# Patient Record
Sex: MALE | Race: BLACK OR AFRICAN AMERICAN | NOT HISPANIC OR LATINO | URBAN - METROPOLITAN AREA
[De-identification: names, ages, dates, MRNs, and addresses within clinical notes are randomized per-mention and may not be internally consistent; named-entity substitution may affect disease eponyms.]

---

## 2021-08-31 ENCOUNTER — EMERGENCY (EMERGENCY)
Facility: HOSPITAL | Age: 31
LOS: 1 days | Discharge: HOME | End: 2021-08-31
Attending: EMERGENCY MEDICINE | Admitting: EMERGENCY MEDICINE
Payer: COMMERCIAL

## 2021-08-31 VITALS
DIASTOLIC BLOOD PRESSURE: 81 MMHG | SYSTOLIC BLOOD PRESSURE: 152 MMHG | HEART RATE: 76 BPM | WEIGHT: 250 LBS | RESPIRATION RATE: 18 BRPM | HEIGHT: 73 IN | TEMPERATURE: 98 F | OXYGEN SATURATION: 95 %

## 2021-08-31 VITALS
DIASTOLIC BLOOD PRESSURE: 72 MMHG | HEART RATE: 66 BPM | RESPIRATION RATE: 18 BRPM | TEMPERATURE: 98 F | SYSTOLIC BLOOD PRESSURE: 119 MMHG | OXYGEN SATURATION: 96 %

## 2021-08-31 DIAGNOSIS — Z20.822 CONTACT WITH AND (SUSPECTED) EXPOSURE TO COVID-19: ICD-10-CM

## 2021-08-31 DIAGNOSIS — Z83.2 FAMILY HISTORY OF DISEASES OF THE BLOOD AND BLOOD-FORMING ORGANS AND CERTAIN DISORDERS INVOLVING THE IMMUNE MECHANISM: ICD-10-CM

## 2021-08-31 DIAGNOSIS — D57.1 SICKLE-CELL DISEASE WITHOUT CRISIS: ICD-10-CM

## 2021-08-31 DIAGNOSIS — M25.551 PAIN IN RIGHT HIP: ICD-10-CM

## 2021-08-31 DIAGNOSIS — M25.561 PAIN IN RIGHT KNEE: ICD-10-CM

## 2021-08-31 DIAGNOSIS — D72.829 ELEVATED WHITE BLOOD CELL COUNT, UNSPECIFIED: ICD-10-CM

## 2021-08-31 LAB
ALBUMIN SERPL ELPH-MCNC: 3.9 G/DL — SIGNIFICANT CHANGE UP (ref 3.4–5)
ALP SERPL-CCNC: 78 U/L — SIGNIFICANT CHANGE UP (ref 40–120)
ALT FLD-CCNC: 77 U/L — HIGH (ref 12–42)
ANION GAP SERPL CALC-SCNC: 10 MMOL/L — SIGNIFICANT CHANGE UP (ref 9–16)
APPEARANCE UR: CLEAR — SIGNIFICANT CHANGE UP
AST SERPL-CCNC: 71 U/L — HIGH (ref 15–37)
BASO STIPL BLD QL SMEAR: PRESENT — SIGNIFICANT CHANGE UP
BASOPHILS # BLD AUTO: 0.07 K/UL — SIGNIFICANT CHANGE UP (ref 0–0.2)
BASOPHILS NFR BLD AUTO: 0.4 % — SIGNIFICANT CHANGE UP (ref 0–2)
BILIRUB SERPL-MCNC: 1.9 MG/DL — HIGH (ref 0.2–1.2)
BILIRUB UR-MCNC: NEGATIVE — SIGNIFICANT CHANGE UP
BUN SERPL-MCNC: 11 MG/DL — SIGNIFICANT CHANGE UP (ref 7–23)
CALCIUM SERPL-MCNC: 9.1 MG/DL — SIGNIFICANT CHANGE UP (ref 8.5–10.5)
CHLORIDE SERPL-SCNC: 100 MMOL/L — SIGNIFICANT CHANGE UP (ref 96–108)
CO2 SERPL-SCNC: 27 MMOL/L — SIGNIFICANT CHANGE UP (ref 22–31)
COLOR SPEC: YELLOW — SIGNIFICANT CHANGE UP
CREAT SERPL-MCNC: 1.1 MG/DL — SIGNIFICANT CHANGE UP (ref 0.5–1.3)
DIFF PNL FLD: NEGATIVE — SIGNIFICANT CHANGE UP
EOSINOPHIL # BLD AUTO: 0.34 K/UL — SIGNIFICANT CHANGE UP (ref 0–0.5)
EOSINOPHIL NFR BLD AUTO: 1.9 % — SIGNIFICANT CHANGE UP (ref 0–6)
GLUCOSE SERPL-MCNC: 96 MG/DL — SIGNIFICANT CHANGE UP (ref 70–99)
GLUCOSE UR QL: NEGATIVE — SIGNIFICANT CHANGE UP
HCT VFR BLD CALC: 37.8 % — LOW (ref 39–50)
HGB BLD-MCNC: 14 G/DL — SIGNIFICANT CHANGE UP (ref 13–17)
HOWELL-JOLLY BOD BLD QL SMEAR: PRESENT — SIGNIFICANT CHANGE UP
IMM GRANULOCYTES NFR BLD AUTO: 0.3 % — SIGNIFICANT CHANGE UP (ref 0–1.5)
KETONES UR-MCNC: NEGATIVE — SIGNIFICANT CHANGE UP
LEUKOCYTE ESTERASE UR-ACNC: NEGATIVE — SIGNIFICANT CHANGE UP
LG PLATELETS BLD QL AUTO: SLIGHT — SIGNIFICANT CHANGE UP
LYMPHOCYTES # BLD AUTO: 12.9 % — LOW (ref 13–44)
LYMPHOCYTES # BLD AUTO: 2.31 K/UL — SIGNIFICANT CHANGE UP (ref 1–3.3)
MACROCYTES BLD QL: SIGNIFICANT CHANGE UP
MANUAL SMEAR VERIFICATION: SIGNIFICANT CHANGE UP
MCHC RBC-ENTMCNC: 30.4 PG — SIGNIFICANT CHANGE UP (ref 27–34)
MCHC RBC-ENTMCNC: 37 GM/DL — HIGH (ref 32–36)
MCV RBC AUTO: 82 FL — SIGNIFICANT CHANGE UP (ref 80–100)
MICROCYTES BLD QL: SLIGHT — SIGNIFICANT CHANGE UP
MONOCYTES # BLD AUTO: 2.57 K/UL — HIGH (ref 0–0.9)
MONOCYTES NFR BLD AUTO: 14.3 % — HIGH (ref 2–14)
NEUTROPHILS # BLD AUTO: 12.58 K/UL — HIGH (ref 1.8–7.4)
NEUTROPHILS NFR BLD AUTO: 70.2 % — SIGNIFICANT CHANGE UP (ref 43–77)
NITRITE UR-MCNC: NEGATIVE — SIGNIFICANT CHANGE UP
NRBC # BLD: 1 /100 WBCS — HIGH (ref 0–0)
PAPPENHEIMER BOD BLD QL SMEAR: PRESENT — SIGNIFICANT CHANGE UP
PH UR: 6 — SIGNIFICANT CHANGE UP (ref 5–8)
PLAT MORPH BLD: ABNORMAL
PLATELET # BLD AUTO: 412 K/UL — HIGH (ref 150–400)
PLATELET CLUMP BLD QL SMEAR: ABNORMAL
PLATELET COUNT - ESTIMATE: ABNORMAL
POTASSIUM SERPL-MCNC: 3.4 MMOL/L — LOW (ref 3.5–5.3)
POTASSIUM SERPL-SCNC: 3.4 MMOL/L — LOW (ref 3.5–5.3)
PROT SERPL-MCNC: 8.1 G/DL — SIGNIFICANT CHANGE UP (ref 6.4–8.2)
PROT UR-MCNC: NEGATIVE MG/DL — SIGNIFICANT CHANGE UP
RBC # BLD: 4.61 M/UL — SIGNIFICANT CHANGE UP (ref 4.2–5.8)
RBC # BLD: 4.61 M/UL — SIGNIFICANT CHANGE UP (ref 4.2–5.8)
RBC # FLD: 15.8 % — HIGH (ref 10.3–14.5)
RBC BLD AUTO: ABNORMAL
RETICS #: 287.4 K/UL — HIGH (ref 25–125)
RETICS/RBC NFR: 6.2 % — HIGH (ref 0.5–2.5)
SARS-COV-2 RNA SPEC QL NAA+PROBE: SIGNIFICANT CHANGE UP
SCHISTOCYTES BLD QL AUTO: SIGNIFICANT CHANGE UP
SICKLE CELLS BLD QL SMEAR: SIGNIFICANT CHANGE UP
SODIUM SERPL-SCNC: 137 MMOL/L — SIGNIFICANT CHANGE UP (ref 132–145)
SP GR SPEC: 1.01 — SIGNIFICANT CHANGE UP (ref 1–1.03)
STOMATOCYTES BLD QL SMEAR: SLIGHT — SIGNIFICANT CHANGE UP
TARGETS BLD QL SMEAR: SIGNIFICANT CHANGE UP
UROBILINOGEN FLD QL: 1 E.U./DL — SIGNIFICANT CHANGE UP
WBC # BLD: 17.93 K/UL — HIGH (ref 3.8–10.5)
WBC # FLD AUTO: 17.93 K/UL — HIGH (ref 3.8–10.5)

## 2021-08-31 PROCEDURE — 73501 X-RAY EXAM HIP UNI 1 VIEW: CPT | Mod: 26,RT

## 2021-08-31 PROCEDURE — 99284 EMERGENCY DEPT VISIT MOD MDM: CPT

## 2021-08-31 PROCEDURE — 71045 X-RAY EXAM CHEST 1 VIEW: CPT | Mod: 26

## 2021-08-31 RX ORDER — OXYCODONE AND ACETAMINOPHEN 5; 325 MG/1; MG/1
1 TABLET ORAL
Qty: 8 | Refills: 0
Start: 2021-08-31 | End: 2021-09-01

## 2021-08-31 RX ORDER — HYDROMORPHONE HYDROCHLORIDE 2 MG/ML
1 INJECTION INTRAMUSCULAR; INTRAVENOUS; SUBCUTANEOUS ONCE
Refills: 0 | Status: DISCONTINUED | OUTPATIENT
Start: 2021-08-31 | End: 2021-08-31

## 2021-08-31 RX ORDER — SODIUM CHLORIDE 9 MG/ML
1000 INJECTION INTRAMUSCULAR; INTRAVENOUS; SUBCUTANEOUS ONCE
Refills: 0 | Status: COMPLETED | OUTPATIENT
Start: 2021-08-31 | End: 2021-08-31

## 2021-08-31 RX ORDER — KETOROLAC TROMETHAMINE 30 MG/ML
15 SYRINGE (ML) INJECTION ONCE
Refills: 0 | Status: DISCONTINUED | OUTPATIENT
Start: 2021-08-31 | End: 2021-08-31

## 2021-08-31 RX ORDER — ACETAMINOPHEN 500 MG
650 TABLET ORAL ONCE
Refills: 0 | Status: COMPLETED | OUTPATIENT
Start: 2021-08-31 | End: 2021-08-31

## 2021-08-31 RX ADMIN — HYDROMORPHONE HYDROCHLORIDE 1 MILLIGRAM(S): 2 INJECTION INTRAMUSCULAR; INTRAVENOUS; SUBCUTANEOUS at 18:57

## 2021-08-31 RX ADMIN — Medication 15 MILLIGRAM(S): at 17:22

## 2021-08-31 RX ADMIN — Medication 15 MILLIGRAM(S): at 18:58

## 2021-08-31 RX ADMIN — SODIUM CHLORIDE 1000 MILLILITER(S): 9 INJECTION INTRAMUSCULAR; INTRAVENOUS; SUBCUTANEOUS at 18:45

## 2021-08-31 RX ADMIN — HYDROMORPHONE HYDROCHLORIDE 1 MILLIGRAM(S): 2 INJECTION INTRAMUSCULAR; INTRAVENOUS; SUBCUTANEOUS at 18:58

## 2021-08-31 RX ADMIN — Medication 650 MILLIGRAM(S): at 18:57

## 2021-08-31 RX ADMIN — HYDROMORPHONE HYDROCHLORIDE 1 MILLIGRAM(S): 2 INJECTION INTRAMUSCULAR; INTRAVENOUS; SUBCUTANEOUS at 17:22

## 2021-08-31 RX ADMIN — SODIUM CHLORIDE 1000 MILLILITER(S): 9 INJECTION INTRAMUSCULAR; INTRAVENOUS; SUBCUTANEOUS at 19:35

## 2021-08-31 RX ADMIN — HYDROMORPHONE HYDROCHLORIDE 1 MILLIGRAM(S): 2 INJECTION INTRAMUSCULAR; INTRAVENOUS; SUBCUTANEOUS at 20:48

## 2021-08-31 NOTE — ED PROVIDER NOTE - NSFOLLOWUPINSTRUCTIONS_ED_ALL_ED_FT
You were seen in the ED for a Sickle Cell Pain Crisis.    Sickle cell anemia is a condition in which red blood cells have an abnormal “sickle” shape. Red blood cells carry oxygen through the body. Sickle-shaped red blood cells do not live as long as normal red blood cells. They also clump together and block blood from flowing through the blood vessels. This condition prevents the body from getting enough oxygen. Sickle cell anemia causes organ damage and pain. It also increases the risk of infection.     What are the causes?  This condition is caused by a gene that is passed from parent to child (inherited). Receiving two copies of the gene causes the disease. Receiving one copy causes the "trait," which means that symptoms are milder or not present.    What increases the risk?  This condition is more likely to develop if your ancestors were from [place], the [place], [place], [place], [place], or the [place].    What are the signs or symptoms?  Symptoms of this condition include:    Episodes of pain (crises), especially in the hands and feet, joints, back, chest, or abdomen. The pain can be triggered by:  An illness, especially if there is dehydration.  Doing an activity with great effort (overexertion).  Exposure to extreme temperature changes.  High altitude.  Fatigue.  Shortness of breath or difficulty breathing.  Dizziness.  Pale skin or yellowed skin (jaundice).  Frequent bacterial infections.  Pain and swelling in the hands and feet (hand-food syndrome).  Prolonged, painful erection of the penis (priapism).   Acute chest syndrome. Symptoms of this include:  Chest pain.  Fever.  Cough.  Fast breathing.  Stroke.  Decreased activity.  Loss of appetite.  Change in behavior.  Headaches.  Seizures.  Vision changes.  Skin ulcers.  Heart disease.  High blood pressure.  Gallstones.  Liver and kidney problems.    How is this diagnosed?  This condition is diagnosed with blood tests that check for the gene that causes this condition.    How is this treated?  There is no cure for most cases of this condition. Treatment focuses on managing your symptoms and preventing complications of the disease. Your health care provider will work with you to identify the best treatment options for you based on an assessment of your condition. Treatment may include:    Medicines, including:  Pain medicines.  Antibiotic medicines for infection.  Medicines to increase the production of a protein in red blood cells that helps carry oxygen in the body (hemoglobin).  Fluids to treat pain and swelling.  Oxygen to treat acute chest syndrome.  Blood transfusions to treat symptoms such as fatigue, stroke, and acute chest syndrome.  Massage and physical therapy for pain.  Regular tests to monitor your condition, such as blood tests, X-rays, CT scans, MRI scans, ultrasounds, and lung function tests. These should be done every 3–12 months, depending on your age.  Hematopoietic stem cell transplant. This is a procedure to replace abnormal stem cells with healthy stem cells from a donor's bone marrow. Stem cells are cells that can develop into blood cells, and bone marrow is the spongy tissue inside the bones.    Follow these instructions at home:  Medicines    Take over-the-counter and prescription medicines only as told by your health care provider.  If you were prescribed an antibiotic medicine, take it as told by your health care provider. Do not stop taking the antibiotic even if you start to feel better.  If you develop a fever, do not take medicines to reduce the fever right away. This could cover up another problem. Notify your health care provider.    Managing pain, stiffness, and swelling    Try these methods to help ease your pain:  Using a heating pad.  Taking a warm bath.  Distracting yourself, such as by watching TV.    Eating and drinking    Drink enough fluid to keep your urine clear or pale yellow. Drink more in hot weather and during exercise.  Limit or avoid drinking alcohol.  Eat a balanced and nutritious diet. Eat plenty of fruits, vegetables, whole grains, and lean protein.  Take vitamins and supplements as directed by your health care provider.    Traveling    When traveling, keep these with you:  Your medical information.  The names of your health care providers.  Your medicines.  If you have to travel by air, ask about precautions you should take.    Activity    Get plenty of rest.  Avoid activities that will lower your oxygen levels, such as exercising vigorously.    General instructions    Do not use any products that contain nicotine or tobacco, such as cigarettes and e-cigarettes. They lower blood oxygen levels. If you need help quitting, ask your health care provider.  Consider wearing a medical alert bracelet.  Avoid high altitudes.  Avoid extreme temperatures and extreme temperature changes.  Keep all follow-up visits as told by your health care provider. This is important.    Contact a health care provider if:  You develop joint pain.  Your feet or hands swell or have pain.  You have fatigue.    Get help right away if:  You have symptoms of infection. These include:  Fever.  Chills.  Extreme tiredness.  Irritability.  Poor eating.  Vomiting.  You feel dizzy or faint.  You have new abdominal pain, especially on the left side near the stomach area.  You develop priapism.  You have numbness in your arms or legs or have trouble moving them.  You have trouble talking.  You develop pain that cannot be controlled with medicine.  You become short of breath.  You have rapid breathing.  You have a persistent cough.  You have pain in your chest.  You develop a severe headache or stiff neck.  You feel bloated without eating or after eating a small amount of food.  Your skin is pale.  You suddenly lose vision.    Summary  Sickle cell anemia is a condition in which red blood cells have an abnormal “sickle” shape. This disease can cause organ damage and chronic pain, and it can raise your risk of infection.  Sickle cell anemia is a genetic disorder.  Treatment focuses on managing your symptoms and preventing complications of the disease.  Get medical help right away if you have any signs of infection, such as a fever. You were seen in the ED for a Sickle Cell Pain Crisis.  Your COVID-19 test (PCR) was NEGATIVE.    Sickle cell anemia is a condition in which red blood cells have an abnormal “sickle” shape. Red blood cells carry oxygen through the body. Sickle-shaped red blood cells do not live as long as normal red blood cells. They also clump together and block blood from flowing through the blood vessels. This condition prevents the body from getting enough oxygen. Sickle cell anemia causes organ damage and pain. It also increases the risk of infection.     What are the causes?  This condition is caused by a gene that is passed from parent to child (inherited). Receiving two copies of the gene causes the disease. Receiving one copy causes the "trait," which means that symptoms are milder or not present.    What increases the risk?  This condition is more likely to develop if your ancestors were from [place], [place], [place], [place], [place], or the [place].    What are the signs or symptoms?  Symptoms of this condition include:    Episodes of pain (crises), especially in the hands and feet, joints, back, chest, or abdomen. The pain can be triggered by:  An illness, especially if there is dehydration.  Doing an activity with great effort (overexertion).  Exposure to extreme temperature changes.  High altitude.  Fatigue.  Shortness of breath or difficulty breathing.  Dizziness.  Pale skin or yellowed skin (jaundice).  Frequent bacterial infections.  Pain and swelling in the hands and feet (hand-food syndrome).  Prolonged, painful erection of the penis (priapism).   Acute chest syndrome. Symptoms of this include:  Chest pain.  Fever.  Cough.  Fast breathing.  Stroke.  Decreased activity.  Loss of appetite.  Change in behavior.  Headaches.  Seizures.  Vision changes.  Skin ulcers.  Heart disease.  High blood pressure.  Gallstones.  Liver and kidney problems.    How is this diagnosed?  This condition is diagnosed with blood tests that check for the gene that causes this condition.    How is this treated?  There is no cure for most cases of this condition. Treatment focuses on managing your symptoms and preventing complications of the disease. Your health care provider will work with you to identify the best treatment options for you based on an assessment of your condition. Treatment may include:    Medicines, including:  Pain medicines.  Antibiotic medicines for infection.  Medicines to increase the production of a protein in red blood cells that helps carry oxygen in the body (hemoglobin).  Fluids to treat pain and swelling.  Oxygen to treat acute chest syndrome.  Blood transfusions to treat symptoms such as fatigue, stroke, and acute chest syndrome.  Massage and physical therapy for pain.  Regular tests to monitor your condition, such as blood tests, X-rays, CT scans, MRI scans, ultrasounds, and lung function tests. These should be done every 3–12 months, depending on your age.  Hematopoietic stem cell transplant. This is a procedure to replace abnormal stem cells with healthy stem cells from a donor's bone marrow. Stem cells are cells that can develop into blood cells, and bone marrow is the spongy tissue inside the bones.    Follow these instructions at home:  Medicines    Take over-the-counter and prescription medicines only as told by your health care provider.  If you were prescribed an antibiotic medicine, take it as told by your health care provider. Do not stop taking the antibiotic even if you start to feel better.  If you develop a fever, do not take medicines to reduce the fever right away. This could cover up another problem. Notify your health care provider.    Managing pain, stiffness, and swelling    Try these methods to help ease your pain:  Using a heating pad.  Taking a warm bath.  Distracting yourself, such as by watching TV.    Eating and drinking    Drink enough fluid to keep your urine clear or pale yellow. Drink more in hot weather and during exercise.  Limit or avoid drinking alcohol.  Eat a balanced and nutritious diet. Eat plenty of fruits, vegetables, whole grains, and lean protein.  Take vitamins and supplements as directed by your health care provider.    Traveling    When traveling, keep these with you:  Your medical information.  The names of your health care providers.  Your medicines.  If you have to travel by air, ask about precautions you should take.    Activity    Get plenty of rest.  Avoid activities that will lower your oxygen levels, such as exercising vigorously.    General instructions    Do not use any products that contain nicotine or tobacco, such as cigarettes and e-cigarettes. They lower blood oxygen levels. If you need help quitting, ask your health care provider.  Consider wearing a medical alert bracelet.  Avoid high altitudes.  Avoid extreme temperatures and extreme temperature changes.  Keep all follow-up visits as told by your health care provider. This is important.    Contact a health care provider if:  You develop joint pain.  Your feet or hands swell or have pain.  You have fatigue.    Get help right away if:  You have symptoms of infection. These include:  Fever.  Chills.  Extreme tiredness.  Irritability.  Poor eating.  Vomiting.  You feel dizzy or faint.  You have new abdominal pain, especially on the left side near the stomach area.  You develop priapism.  You have numbness in your arms or legs or have trouble moving them.  You have trouble talking.  You develop pain that cannot be controlled with medicine.  You become short of breath.  You have rapid breathing.  You have a persistent cough.  You have pain in your chest.  You develop a severe headache or stiff neck.  You feel bloated without eating or after eating a small amount of food.  Your skin is pale.  You suddenly lose vision.    Summary  Sickle cell anemia is a condition in which red blood cells have an abnormal “sickle” shape. This disease can cause organ damage and chronic pain, and it can raise your risk of infection.  Sickle cell anemia is a genetic disorder.  Treatment focuses on managing your symptoms and preventing complications of the disease.  Get medical help right away if you have any signs of infection, such as a fever.

## 2021-08-31 NOTE — ED PROVIDER NOTE - PATIENT PORTAL LINK FT
You can access the FollowMyHealth Patient Portal offered by [facility] by registering at the following website: [email or web address]. By joining [employer] [facility]’s FollowMyHealth portal, you will also be able to view your health information using other applications (apps) compatible with our system.

## 2021-08-31 NOTE — ED ADULT NURSE NOTE - OBJECTIVE STATEMENT
Pt w/ PMH of SCA presents c/o 8/10 pain to right hip and right knee since Saturday.  Pt has been attempting to control pain w/ tylenol and percocet, but endorses limited relief.  Pt denies CP, fall, injury, SOB or abdominal pain.  Pt is able to bear weight and ambulate.

## 2021-08-31 NOTE — ED PROVIDER NOTE - NS ED ROS FT
Other than symptoms associated with present events the following is reported:  General:  No fever, no chills, no weight loss.  HEENT:  No sore throat.  Respiratory: No cough, no dyspnea, no wheeze.  Cardiovascular:  No chest pain, no palpitations, no orthopnea.  GI: No abdominal pain, no nausea/vomiting, no diarrhea.  : No dysuria, no frequency, no urgency.  Musculoskeletal:  +Knee pain. +Hip pain.   Endocrine:  No generalized weakness, no polyuria.  Neurological:  No headache, no focal weakness.   Psychiatric: No emotional stress, no depression.  Derm:  No rash.  Heme:  No bruising, no bleeding.

## 2021-08-31 NOTE — ED PROVIDER NOTE - CLINICAL SUMMARY MEDICAL DECISION MAKING FREE TEXT BOX
32 y/o male with history of sickle cell anemia presents with right leg pain typical of sickle cell crisis. No chest pain or SOB to suggest acute chest. On exam, Patient is afebrile, well appearing, and with FROM of right hip. X-ray is negative for fracture or gross abnormality. Labs show leukocytosis, nonspecific. Do not suspect septic arthritis clinically. Will check UA, chest x-ray, and COVID swab to evaluate further. 32 y/o male with history of sickle cell anemia presents with right hip and knee pain typical of sickle cell crisis. Exam unremarkable.  No chest pain or SOB to suggest acute chest. On exam, Patient is afebrile, well appearing, with FROM of right hip.  Suspect sickle cell crisis.    X-ray R hip is negative for fracture or gross abnormality.   Labs show leukocytosis, nonspecific. Pt states he usually has elevated WBC during sickle cell crisis.  Do not suspect septic arthritis clinically.    CXR negative for PNA.      Pt feels better after IV dilaudid, toradol, and hydration.  UA pending to eval for UTI.  COVID swab pending.  If pt feels better after analgesia, OK to dc home with plan for follow up with PMD.

## 2021-08-31 NOTE — ED PROVIDER NOTE - PROGRESS NOTE DETAILS
Patient feeling better, but requesting 3rd (and final) dose of Dilaudid for pain.  He feels like he can go home and will F/U with hematologist re: sickle cell disease.  Instructed to return if symptoms worsen. Care turned over to me @ 1930.  Patient feeling better, but requesting 3rd (and final) dose of Dilaudid for pain.  He feels like he can go home and will F/U with hematologist re: sickle cell disease.  Instructed to return if symptoms worsen.  -- Toribio Robles MD Care turned over to me @ 1930 by Dr. Mindy Matos.  Patient feeling better, but requesting 3rd (and final) dose of Dilaudid for pain.  He feels like he can go home and will F/U with hematologist re: sickle cell disease.  Instructed to return if symptoms worsen.  -- Toribio Robles MD

## 2021-08-31 NOTE — ED ADULT NURSE REASSESSMENT NOTE - NS ED NURSE REASSESS COMMENT FT1
received Pt from previous RN sitting up on stretcher awake and alert, breathing with ease on RA and NAD. IV site is patent. IVF completed, DC same. Urine sample collected and sent. Awaiting dispo.

## 2021-08-31 NOTE — ED PROVIDER NOTE - OBJECTIVE STATEMENT
30 y/o male with PMHx of sickle cell anemia presents to the ED with complaints of aching right hip and knee pain x 3 days, typical of his usual symptoms with sickle cell crisis. No associated chest pain or SOB. Patient states he has not needed to go to the ED or be hospitalized for the past 4 years. Taking Percocet at home w/o relief. No history avascular necrosis. 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 No associated chest pain or SOB. Patient states he has not needed to go to the ED or be hospitalized for the past 4 years. Taking Percocet at home w/o relief. No history avascular necrosis.

## 2021-08-31 NOTE — ED PROVIDER NOTE - PHYSICAL EXAMINATION
VITAL SIGNS: I have reviewed nursing notes and confirm.  CONSTITUTIONAL: Well-developed; well-nourished; in no acute distress.  SKIN: Skin is warm and dry, no acute rash.  HEAD: Normocephalic; atraumatic.  EYES: PERRL, EOM intact; conjunctiva and sclera clear.  ENT: No nasal discharge; airway clear.  NECK: Supple; non tender.  CARD: S1, S2 normal; no murmurs, gallops, or rubs. Regular rate and rhythm.  RESP: No wheezes, rales or rhonchi.  ABD: Normal bowel sounds; soft; non-distended; non-tender; no hepatosplenomegaly.  EXT: No tenderness to right hip or knee. Full active and passive ROM of right hip and knee.   NEURO: Alert, oriented. Grossly unremarkable.  PSYCH: Cooperative, appropriate.

## 2021-08-31 NOTE — ED ADULT TRIAGE NOTE - CHIEF COMPLAINT QUOTE
here for sickle cell crisis - reports pain from right knee to hip- took 2 tabs of 10/325 percocet at 6:30am today with no relief

## 2021-09-08 PROBLEM — Z00.00 ENCOUNTER FOR PREVENTIVE HEALTH EXAMINATION: Status: ACTIVE | Noted: 2021-09-08

## 2023-02-20 ENCOUNTER — NON-APPOINTMENT (OUTPATIENT)
Age: 33
End: 2023-02-20

## 2023-02-22 PROBLEM — D57.1 SICKLE-CELL DISEASE WITHOUT CRISIS: Chronic | Status: ACTIVE | Noted: 2021-09-20

## 2023-02-24 ENCOUNTER — LABORATORY RESULT (OUTPATIENT)
Age: 33
End: 2023-02-24

## 2023-02-24 ENCOUNTER — APPOINTMENT (OUTPATIENT)
Dept: ORTHOPEDIC SURGERY | Facility: CLINIC | Age: 33
End: 2023-02-24
Payer: COMMERCIAL

## 2023-02-24 ENCOUNTER — NON-APPOINTMENT (OUTPATIENT)
Age: 33
End: 2023-02-24

## 2023-02-24 ENCOUNTER — OUTPATIENT (OUTPATIENT)
Dept: OUTPATIENT SERVICES | Facility: HOSPITAL | Age: 33
LOS: 1 days | End: 2023-02-24
Payer: COMMERCIAL

## 2023-02-24 VITALS
OXYGEN SATURATION: 93 % | HEART RATE: 73 BPM | DIASTOLIC BLOOD PRESSURE: 79 MMHG | SYSTOLIC BLOOD PRESSURE: 129 MMHG | HEIGHT: 73 IN | WEIGHT: 248 LBS | BODY MASS INDEX: 32.87 KG/M2

## 2023-02-24 DIAGNOSIS — D57.1 SICKLE-CELL DISEASE W/OUT CRISIS: ICD-10-CM

## 2023-02-24 DIAGNOSIS — M22.2X2 PATELLOFEMORAL DISORDERS, RIGHT KNEE: ICD-10-CM

## 2023-02-24 DIAGNOSIS — S62.009A UNSPECIFIED FRACTURE OF NAVICULAR [SCAPHOID] BONE OF UNSPECIFIED WRIST, INITIAL ENCOUNTER FOR CLOSED FRACTURE: ICD-10-CM

## 2023-02-24 DIAGNOSIS — M22.2X1 PATELLOFEMORAL DISORDERS, RIGHT KNEE: ICD-10-CM

## 2023-02-24 DIAGNOSIS — M25.561 PAIN IN RIGHT KNEE: ICD-10-CM

## 2023-02-24 DIAGNOSIS — M25.562 PAIN IN RIGHT KNEE: ICD-10-CM

## 2023-02-24 DIAGNOSIS — M25.462 EFFUSION, LEFT KNEE: ICD-10-CM

## 2023-02-24 PROCEDURE — 73564 X-RAY EXAM KNEE 4 OR MORE: CPT

## 2023-02-24 PROCEDURE — 99203 OFFICE O/P NEW LOW 30 MIN: CPT | Mod: 25

## 2023-02-24 PROCEDURE — 20611 DRAIN/INJ JOINT/BURSA W/US: CPT | Mod: LT

## 2023-02-24 PROCEDURE — 73564 X-RAY EXAM KNEE 4 OR MORE: CPT | Mod: 26,LT,76

## 2023-02-24 NOTE — PHYSICAL EXAM
[de-identified] : General: Well-nourished, well-developed, alert, and in no acute distress.\par Head: Normocephalic.\par Eyes: Pupils equal, extraocular muscles intact, normal sclera.\par Nose: No nasal discharge.\par Cardiovascular: Extremities are warm and well perfused. Distal pulses are symmetric bilaterally.\par Respiratory: No labored breathing.\par Extremities: Sensation is intact distally bilaterally. Distal pulses are symmetric bilaterally\par Lymphatic: No regional lymphadenopathy, no lymphedema\par Neurologic: No focal deficits\par Skin: Normal skin color, texture, and turgor\par Psychiatric: Normal affect \par \par MSK:\par Examination of left knee:\par \par Gait normal\par Genu slight varus alignment\par Pain with double leg squat\par Valgus moment with single leg squat bilat\par Moderate effusion\par No erythema, hematoma or skin lesion\par Tender to palpation: VMO, quad tendon\par Nontender to palpation: medial joint line, lateral joint line, medial patellar facet, lateral patellar facet, patellar tendon, pes, Gerdy's tubercle, tibial tuberosity, popliteal fossa, hamstrings, ITB \par No warmth\par No Baker's cyst palpable\par ROM: 0-100\par No patellar crepitus\par \par Lachman's negative\par Anterior drawer negative\par Posterior drawer negative\par Varus/valgus stress negative at 0 and 30 deg\par Nikolay  negative\par Patellar grind positive\par Noble's negative\par \par Examination of right knee:\par \par No effusion, erythema, hematoma or skin lesion\par Nontender to palpation: medial joint line, lateral joint line, medial patellar facet, lateral patellar facet, quad tendon, patellar tendon, pes, Gerdy's tubercle, tibial tuberosity, popliteal fossa, hamstrings, ITB \par No warmth\par No Baker's cyst palpable\par ROM: 0-120\par No patellar crepitus\par \par Lachman's negative\par Anterior drawer negative\par Posterior drawer negative\par Varus/valgus stress negative at 0 and 30 deg\par Nikolay  negative\par Patellar grind negative\par Noble's negative\par \par Sensation is intact to light touch over the superficial and deep peroneal nerve distributions and the posterior tibial nerve distribution. Capillary refill is less than two seconds. Posterior tibial and dorsalis pedis pulses 2+ equal bilaterally. No calf swelling or tenderness bilaterally. Strength testing shows 5/5 Quads, 5/5 Hamstrings, 5/5 EHL, 5/5 FHL, 5/5 tibialis anterior, 5/5 gastroc-soleus complex. \par Reflexes: Patellar 2+, Achilles 2+  [de-identified] : XR bilateral knees (2/24/23): No evidence of fracture or dislocation. No joint space narrowing or deformity.

## 2023-02-24 NOTE — PROCEDURE
[de-identified] : Ultrasound guided intra-articular steroid injection of left knee:\par \par Following a discussion of the risks (bleeding, infection) and benefits, verbal consent was obtained. Patient placed in supine position. The suprapatellar recess and surrounding structures (patella, femur, quadriceps tendon, suprapatellar fat pad and prefemoral fat pad) were visualized in SAX and LAX with Sonosite 15 Hz linear transducer. \par \par Superolateral knee was anaesthetised with ethyl chloride spray. Under strict sterile technique the right knee was prepped with chlorhexadine. Using ultrasound guidance (superolateral approach) a 20 G 1.5 inch needle was inserted into the suprapatellar recess and after aspiration of 40 mL of clear, serous fluid, 1mL Triamcinolone, 4mL 0.5% Bupivacaine and 2mL 1% lidocaine was injected intra-articularly.  \par The patient tolerated the procedure well. Post-injection instructions given (no strenuous activity for 48 hours, ice, elevate). Patient verbalized understanding.

## 2023-02-24 NOTE — ASSESSMENT
[FreeTextEntry1] : BLANKA PIÑA is a 32 year old male w/ SCD with left knee pain.\par I discussed with the patient that their symptoms, signs, and imaging are most consistent with patellofemoral syndrome, possible meniscus tear or inflammatory arthropathy. \par We reviewed the natural history of this condition and treatment options.\par  We agreed on the following plan:\par \par \par US guided left knee aspiration and CSI injection performed as detailed above. \par Start Home Exercises for patellar tracking. Demonstration and handout provided. \par Physical therapy. Referral provided.\par Advanced imaging: consider MRI if no improvement in symptoms.\par Follow up in 6-8 weeks.\par

## 2023-02-24 NOTE — HISTORY OF PRESENT ILLNESS
[de-identified] : BLANKA PIÑA is a 32 year old male w/ Sickle Cell HbgS who presents with left knee pain.\par States the onset of pain was 2/16/23\par Initially had right knee pain and swelling 3 weeks ago which resolved with acupuncture\par Played football [employer] had left patellartendinitis which resolved with PT \par Had some quad tendon pain\par Oct 2022 developed right knee swelling when drinking with work colleagues beer, red wine, martinis\par Jan 2023 had bilateral knee pain when squatting under a rope barrier at [facility] then developed right knee swelling\par Told by PT has slight leg length discrepancy (right shorter than left). Wears small heel lift\par Has SCD with multiple hospitalizations for crisis involving back, UE. Never affected knees before.\par No antecedent trauma or injury.\par Was seen in [facility] on 2/21/23 and was given Ketorolac injection which helped somewhat. No XR taken at that time.\par Pain is nonradiating.\par There is associated swelling.\par There is no associated erythema, warmth, stiffness, numbness, paraesthesia or weakness.\par Exacerbating factors are descending stairs, rising from seated position.\par Has been taking ibuprofen with some effect.\par Exercises regularly. 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

## 2023-02-28 ENCOUNTER — TRANSCRIPTION ENCOUNTER (OUTPATIENT)
Age: 33
End: 2023-02-28

## 2023-03-10 ENCOUNTER — OUTPATIENT (OUTPATIENT)
Dept: OUTPATIENT SERVICES | Facility: HOSPITAL | Age: 33
LOS: 1 days | End: 2023-03-10

## 2023-03-10 ENCOUNTER — APPOINTMENT (OUTPATIENT)
Dept: MRI IMAGING | Facility: CLINIC | Age: 33
End: 2023-03-10
Payer: COMMERCIAL

## 2023-03-10 PROCEDURE — 73721 MRI JNT OF LWR EXTRE W/O DYE: CPT | Mod: 26,LT

## 2023-06-14 NOTE — ED ADULT NURSE NOTE - DATE OF LAST VACCINATION
Was The Patient On Physician Recommended Anticoagulation Therapy?: Please Select the Appropriate Response 31-May-2021

## 2024-03-13 ENCOUNTER — EMERGENCY (EMERGENCY)
Facility: HOSPITAL | Age: 34
LOS: 1 days | Discharge: HOME | End: 2024-03-13
Attending: EMERGENCY MEDICINE | Admitting: EMERGENCY MEDICINE
Payer: COMMERCIAL

## 2024-03-13 VITALS
HEART RATE: 104 BPM | TEMPERATURE: 100 F | DIASTOLIC BLOOD PRESSURE: 83 MMHG | OXYGEN SATURATION: 100 % | SYSTOLIC BLOOD PRESSURE: 138 MMHG | RESPIRATION RATE: 22 BRPM

## 2024-03-13 DIAGNOSIS — R50.9 FEVER, UNSPECIFIED: ICD-10-CM

## 2024-03-13 DIAGNOSIS — Z20.822 CONTACT WITH AND (SUSPECTED) EXPOSURE TO COVID-19: ICD-10-CM

## 2024-03-13 DIAGNOSIS — R10.9 UNSPECIFIED ABDOMINAL PAIN: ICD-10-CM

## 2024-03-13 DIAGNOSIS — D57.00 HB-SS DISEASE WITH CRISIS, UNSPECIFIED: ICD-10-CM

## 2024-03-13 LAB
ALBUMIN SERPL ELPH-MCNC: 3.4 G/DL — SIGNIFICANT CHANGE UP (ref 3.4–5)
ALP SERPL-CCNC: 68 U/L — SIGNIFICANT CHANGE UP (ref 40–120)
ALT FLD-CCNC: 60 U/L — HIGH (ref 12–42)
ANION GAP SERPL CALC-SCNC: 8 MMOL/L — LOW (ref 9–16)
APPEARANCE UR: CLEAR — SIGNIFICANT CHANGE UP
APTT BLD: 27.8 SEC — SIGNIFICANT CHANGE UP (ref 24.5–35.6)
AST SERPL-CCNC: 70 U/L — HIGH (ref 15–37)
BASOPHILS # BLD AUTO: 0.06 K/UL — SIGNIFICANT CHANGE UP (ref 0–0.2)
BASOPHILS NFR BLD AUTO: 0.3 % — SIGNIFICANT CHANGE UP (ref 0–2)
BILIRUB SERPL-MCNC: 2.6 MG/DL — HIGH (ref 0.2–1.2)
BILIRUB UR-MCNC: NEGATIVE — SIGNIFICANT CHANGE UP
BUN SERPL-MCNC: 11 MG/DL — SIGNIFICANT CHANGE UP (ref 7–23)
CALCIUM SERPL-MCNC: 8.8 MG/DL — SIGNIFICANT CHANGE UP (ref 8.5–10.5)
CHLORIDE SERPL-SCNC: 103 MMOL/L — SIGNIFICANT CHANGE UP (ref 96–108)
CO2 SERPL-SCNC: 28 MMOL/L — SIGNIFICANT CHANGE UP (ref 22–31)
COLOR SPEC: SIGNIFICANT CHANGE UP
CREAT SERPL-MCNC: 1.13 MG/DL — SIGNIFICANT CHANGE UP (ref 0.5–1.3)
DIFF PNL FLD: NEGATIVE — SIGNIFICANT CHANGE UP
EGFR: 89 ML/MIN/1.73M2 — SIGNIFICANT CHANGE UP
ELLIPTOCYTES BLD QL SMEAR: SLIGHT — SIGNIFICANT CHANGE UP
EOSINOPHIL # BLD AUTO: 0.02 K/UL — SIGNIFICANT CHANGE UP (ref 0–0.5)
EOSINOPHIL NFR BLD AUTO: 0.1 % — SIGNIFICANT CHANGE UP (ref 0–6)
FLUAV AG NPH QL: SIGNIFICANT CHANGE UP
FLUBV AG NPH QL: SIGNIFICANT CHANGE UP
GLUCOSE SERPL-MCNC: 97 MG/DL — SIGNIFICANT CHANGE UP (ref 70–99)
GLUCOSE UR QL: NEGATIVE MG/DL — SIGNIFICANT CHANGE UP
HCT VFR BLD CALC: 35.5 % — LOW (ref 39–50)
HGB BLD-MCNC: 12.3 G/DL — LOW (ref 13–17)
HYPOCHROMIA BLD QL: SIGNIFICANT CHANGE UP
IMM GRANULOCYTES NFR BLD AUTO: 0.7 % — SIGNIFICANT CHANGE UP (ref 0–0.9)
INR BLD: 1.19 — HIGH (ref 0.85–1.18)
KETONES UR-MCNC: NEGATIVE MG/DL — SIGNIFICANT CHANGE UP
LEUKOCYTE ESTERASE UR-ACNC: NEGATIVE — SIGNIFICANT CHANGE UP
LG PLATELETS BLD QL AUTO: SLIGHT — SIGNIFICANT CHANGE UP
LYMPHOCYTES # BLD AUTO: 2.09 K/UL — SIGNIFICANT CHANGE UP (ref 1–3.3)
LYMPHOCYTES # BLD AUTO: 9 % — LOW (ref 13–44)
MANUAL SMEAR VERIFICATION: SIGNIFICANT CHANGE UP
MCHC RBC-ENTMCNC: 28.1 PG — SIGNIFICANT CHANGE UP (ref 27–34)
MCHC RBC-ENTMCNC: 34.6 GM/DL — SIGNIFICANT CHANGE UP (ref 32–36)
MCV RBC AUTO: 81.1 FL — SIGNIFICANT CHANGE UP (ref 80–100)
MICROCYTES BLD QL: SLIGHT — SIGNIFICANT CHANGE UP
MONOCYTES # BLD AUTO: 3.84 K/UL — HIGH (ref 0–0.9)
MONOCYTES NFR BLD AUTO: 16.4 % — HIGH (ref 2–14)
NEUTROPHILS # BLD AUTO: 17.18 K/UL — HIGH (ref 1.8–7.4)
NEUTROPHILS NFR BLD AUTO: 73.5 % — SIGNIFICANT CHANGE UP (ref 43–77)
NITRITE UR-MCNC: NEGATIVE — SIGNIFICANT CHANGE UP
NRBC # BLD: 0 /100 WBCS — SIGNIFICANT CHANGE UP (ref 0–0)
PH UR: 5.5 — SIGNIFICANT CHANGE UP (ref 5–8)
PLAT MORPH BLD: NORMAL — SIGNIFICANT CHANGE UP
PLATELET # BLD AUTO: 516 K/UL — HIGH (ref 150–400)
PLATELET COUNT - ESTIMATE: NORMAL — SIGNIFICANT CHANGE UP
POLYCHROMASIA BLD QL SMEAR: SLIGHT — SIGNIFICANT CHANGE UP
POTASSIUM SERPL-MCNC: 4.2 MMOL/L — SIGNIFICANT CHANGE UP (ref 3.5–5.3)
POTASSIUM SERPL-SCNC: 4.2 MMOL/L — SIGNIFICANT CHANGE UP (ref 3.5–5.3)
PROT SERPL-MCNC: 8.2 G/DL — SIGNIFICANT CHANGE UP (ref 6.4–8.2)
PROT UR-MCNC: NEGATIVE MG/DL — SIGNIFICANT CHANGE UP
PROTHROM AB SERPL-ACNC: 13.4 SEC — HIGH (ref 9.5–13)
RBC # BLD: 4.38 M/UL — SIGNIFICANT CHANGE UP (ref 4.2–5.8)
RBC # BLD: 4.38 M/UL — SIGNIFICANT CHANGE UP (ref 4.2–5.8)
RBC # FLD: 16.1 % — HIGH (ref 10.3–14.5)
RBC BLD AUTO: ABNORMAL
RETICS #: 241.8 K/UL — HIGH (ref 25–125)
RETICS/RBC NFR: 5.5 % — HIGH (ref 0.5–2.5)
RSV RNA NPH QL NAA+NON-PROBE: SIGNIFICANT CHANGE UP
SARS-COV-2 RNA SPEC QL NAA+PROBE: SIGNIFICANT CHANGE UP
SCHISTOCYTES BLD QL AUTO: SLIGHT — SIGNIFICANT CHANGE UP
SICKLE CELLS BLD QL SMEAR: SIGNIFICANT CHANGE UP
SODIUM SERPL-SCNC: 139 MMOL/L — SIGNIFICANT CHANGE UP (ref 132–145)
SP GR SPEC: 1.01 — SIGNIFICANT CHANGE UP (ref 1–1.03)
STOMATOCYTES BLD QL SMEAR: SLIGHT — SIGNIFICANT CHANGE UP
TARGETS BLD QL SMEAR: SLIGHT — SIGNIFICANT CHANGE UP
TROPONIN I, HIGH SENSITIVITY RESULT: <4 NG/L — SIGNIFICANT CHANGE UP
UROBILINOGEN FLD QL: 1 MG/DL — SIGNIFICANT CHANGE UP (ref 0.2–1)
WBC # BLD: 23.35 K/UL — HIGH (ref 3.8–10.5)
WBC # FLD AUTO: 23.35 K/UL — HIGH (ref 3.8–10.5)

## 2024-03-13 PROCEDURE — 99223 1ST HOSP IP/OBS HIGH 75: CPT

## 2024-03-13 PROCEDURE — 76705 ECHO EXAM OF ABDOMEN: CPT | Mod: 26

## 2024-03-13 PROCEDURE — 71045 X-RAY EXAM CHEST 1 VIEW: CPT | Mod: 26

## 2024-03-13 RX ORDER — HYDROMORPHONE HYDROCHLORIDE 2 MG/ML
1 INJECTION INTRAMUSCULAR; INTRAVENOUS; SUBCUTANEOUS ONCE
Refills: 0 | Status: DISCONTINUED | OUTPATIENT
Start: 2024-03-13 | End: 2024-03-13

## 2024-03-13 RX ORDER — HYDROMORPHONE HYDROCHLORIDE 2 MG/ML
2 INJECTION INTRAMUSCULAR; INTRAVENOUS; SUBCUTANEOUS ONCE
Refills: 0 | Status: DISCONTINUED | OUTPATIENT
Start: 2024-03-13 | End: 2024-03-13

## 2024-03-13 RX ORDER — ACETAMINOPHEN 500 MG
650 TABLET ORAL ONCE
Refills: 0 | Status: COMPLETED | OUTPATIENT
Start: 2024-03-13 | End: 2024-03-13

## 2024-03-13 RX ORDER — KETOROLAC TROMETHAMINE 30 MG/ML
15 SYRINGE (ML) INJECTION ONCE
Refills: 0 | Status: DISCONTINUED | OUTPATIENT
Start: 2024-03-13 | End: 2024-03-13

## 2024-03-13 RX ORDER — SODIUM CHLORIDE 9 MG/ML
1000 INJECTION INTRAMUSCULAR; INTRAVENOUS; SUBCUTANEOUS ONCE
Refills: 0 | Status: COMPLETED | OUTPATIENT
Start: 2024-03-13 | End: 2024-03-13

## 2024-03-13 RX ORDER — ACETAMINOPHEN 500 MG
325 TABLET ORAL ONCE
Refills: 0 | Status: COMPLETED | OUTPATIENT
Start: 2024-03-13 | End: 2024-03-13

## 2024-03-13 RX ADMIN — SODIUM CHLORIDE 1000 MILLILITER(S): 9 INJECTION INTRAMUSCULAR; INTRAVENOUS; SUBCUTANEOUS at 18:06

## 2024-03-13 RX ADMIN — Medication 650 MILLIGRAM(S): at 18:07

## 2024-03-13 RX ADMIN — Medication 325 MILLIGRAM(S): at 20:17

## 2024-03-13 RX ADMIN — HYDROMORPHONE HYDROCHLORIDE 2 MILLIGRAM(S): 2 INJECTION INTRAMUSCULAR; INTRAVENOUS; SUBCUTANEOUS at 22:28

## 2024-03-13 RX ADMIN — Medication 15 MILLIGRAM(S): at 18:06

## 2024-03-13 RX ADMIN — HYDROMORPHONE HYDROCHLORIDE 1 MILLIGRAM(S): 2 INJECTION INTRAMUSCULAR; INTRAVENOUS; SUBCUTANEOUS at 20:04

## 2024-03-13 RX ADMIN — SODIUM CHLORIDE 1000 MILLILITER(S): 9 INJECTION INTRAMUSCULAR; INTRAVENOUS; SUBCUTANEOUS at 22:28

## 2024-03-13 RX ADMIN — HYDROMORPHONE HYDROCHLORIDE 1 MILLIGRAM(S): 2 INJECTION INTRAMUSCULAR; INTRAVENOUS; SUBCUTANEOUS at 18:22

## 2024-03-13 RX ADMIN — Medication 15 MILLIGRAM(S): at 21:44

## 2024-03-13 RX ADMIN — HYDROMORPHONE HYDROCHLORIDE 1 MILLIGRAM(S): 2 INJECTION INTRAMUSCULAR; INTRAVENOUS; SUBCUTANEOUS at 18:23

## 2024-03-13 NOTE — ED PROVIDER NOTE - CARDIOVASCULAR NEGATIVE STATEMENT, MLM
The patient has been examined and the H&P has been reviewed:    I concur with the findings and no changes have occurred since H&P was written.    Anesthesia/Surgery risks, benefits and alternative options discussed and understood by patient/family.          There are no hospital problems to display for this patient.    
no chest pain and no edema.

## 2024-03-13 NOTE — ED ADULT NURSE NOTE - OBJECTIVE STATEMENT
Pt BIBEMS from work complaining of sickle cell pain. PT complaining of right sided pain from flank to shoulder. Pt with sob and chest discomfort. Pt states that he last took percocet at 2am

## 2024-03-13 NOTE — ED PROVIDER NOTE - DIFFERENTIAL DIAGNOSIS
Differential Diagnosis Sickle cell crisis.  Need to r/o acute chest syndrome.  Need to r/o underlying infectious process given low grade temp.  Need to r/o acute horacio given elevated LFTs.

## 2024-03-13 NOTE — ED PROVIDER NOTE - NSICDXFAMILYHX_GEN_ALL_CORE_FT
FAMILY HISTORY:  Father  Still living? Yes, Estimated age: Age Unknown  Family history of sickle cell trait, Age at diagnosis: Age Unknown    Mother  Still living? Yes, Estimated age: Age Unknown  Family history of thalassemia, Age at diagnosis: Age Unknown

## 2024-03-13 NOTE — ED CDU PROVIDER INITIAL DAY NOTE - PROGRESS NOTE DETAILS
Pain is currently 3-4/10, down from 10/10 on arrival. Patient's pin returned to 7/10 so re-dosed Dilaudid.  He also had an elevation in fever so re-dosed tylenol and toradol.  Will continue to monitor. After pain improved to 3/10 after second dose of Dilaudid, pain returned to a 9/10 after he got up to use the bathroom.  Will re-dose.  I discussed with pt that if he requires additional IV pain meds, he will likely need to be admitted for prolonged monitoring.

## 2024-03-13 NOTE — ED ADULT TRIAGE NOTE - INTERNATIONAL TRAVEL
Render In Strict Bullet Format?: No Detail Level: Zone Plan: - discussed current treatment \\n- continue Opzelura apply to areas once a day \\n- continue Finasteride 1mg qd\\n- discussed potential side effect on minoxidil 10mg daily. (higher dosage rec bu Dr. KATRINA Mejia [facility] - BP stable). Pt aware and opts to continue with monitoring blood pressure \\n- continue Allegra daily \\n- follow up 3 months No

## 2024-03-13 NOTE — ED PROVIDER NOTE - CLINICAL SUMMARY MEDICAL DECISION MAKING FREE TEXT BOX
Pt is a 32yo M with a h/o sickle cell and p/w sxs c/w sickle cell pain crisis, likely triggered by infection - viral syndrome vs PNA vs acute horacio.  Will perform CXR, EKG, labs, retic count, and viral swab.  Will give tylenol for temp, toradol and dilaudid for pain, and place on observation for frequent re-evaluation.

## 2024-03-13 NOTE — ED CDU PROVIDER INITIAL DAY NOTE - CLINICAL SUMMARY MEDICAL DECISION MAKING FREE TEXT BOX
Patient with sickle cell crisis.  No obvious infection source at this time.  Pain improving with treatment.  Awaiting US results to r/o horacio.  No e/o infiltrate on CXR.  EKG wnl, troponin added on given right sided cp.

## 2024-03-13 NOTE — ED PROVIDER NOTE - PHYSICAL EXAMINATION
VITAL SIGNS: I have reviewed nursing notes and confirm.  CONSTITUTIONAL: Well-developed; well-nourished; appears uncomfortable.   SKIN: Skin is warm and dry, no acute rash.  HEAD: Normocephalic; atraumatic.  EYES: PERRL, EOM intact; conjunctiva and sclera clear.  ENT: No nasal discharge; airway clear.  NECK: Supple; non tender.  CARD: S1, S2 normal; no murmurs, gallops, or rubs. Regular rate (99) and rhythm.  RESP: No wheezes, rales or rhonchi.  ABD: Normal bowel sounds; soft; non-distended; non-tender; no hepatosplenomegaly.  MSK: Normal ROM. No clubbing, cyanosis or edema.  NEURO: Alert, oriented. Grossly unremarkable.  PSYCH: Cooperative, appropriate.

## 2024-03-13 NOTE — ED PROVIDER NOTE - PROGRESS NOTE DETAILS
Improved after first dose of Dilaudid.  Discussed all results and want to perform RUQ US given elevated AST/ALT.

## 2024-03-13 NOTE — ED ADULT NURSE NOTE - NSHOSCREENINGQ1_ED_ALL_ED
No Cardiology NP     Patient is a 67y old  Male who presents with a chief complaint of Jaw and right sided chest pain , NSTEMI (30 Nov 2023 07:28)      HPI:    This is a 66 yo Male with no significant PM HX presented with chest pain after a Yoga session. Pain started in the right jaw area, then moved to the right side of the chest with diaphoresis. His pain lasted from 7:30 pm to 11:30 pm last night. No prior h/o of any CAD. His father had CABG x4 , when he was 80 years old. No other complain, Patient is pain free now,  (30 Nov 2023 04:57)      PAST MEDICAL & SURGICAL HISTORY:  No pertinent past medical history    No significant past surgical history        MEDICATIONS  (STANDING):  aspirin enteric coated 81 milliGRAM(s) Oral daily  atorvastatin 80 milliGRAM(s) Oral at bedtime  dorzolamide 2% Ophthalmic Solution 1 Drop(s) Both EYES <User Schedule>  lisinopril 5 milliGRAM(s) Oral daily  metoprolol tartrate 25 milliGRAM(s) Oral two times a day  sodium chloride 0.9%. 1000 milliLiter(s) (200 mL/Hr) IV Continuous <Continuous>  sodium chloride 0.9%. 1000 milliLiter(s) (250 mL/Hr) IV Continuous <Continuous>  ticagrelor 90 milliGRAM(s) Oral every 12 hours    MEDICATIONS  (PRN):  acetaminophen     Tablet .. 650 milliGRAM(s) Oral every 6 hours PRN Temp greater or equal to 38C (100.4F), Mild Pain (1 - 3)  aluminum hydroxide/magnesium hydroxide/simethicone Suspension 30 milliLiter(s) Oral every 4 hours PRN Dyspepsia  melatonin 3 milliGRAM(s) Oral at bedtime PRN Insomnia  ondansetron Injectable 4 milliGRAM(s) IV Push every 8 hours PRN Nausea and/or Vomiting      Allergies    No Known Allergies    REVIEW OF SYSTEMS: As mentioned in HPI all others Negative     Vital Signs Last 24 Hrs  T(C): 36.6 (30 Nov 2023 07:51), Max: 36.9 (29 Nov 2023 21:31)  T(F): 97.8 (30 Nov 2023 07:51), Max: 98.4 (29 Nov 2023 21:31)  HR: 58 (30 Nov 2023 07:51) (55 - 71)  BP: 161/86 (30 Nov 2023 07:51) (131/83 - 167/72)  BP(mean): 96 (30 Nov 2023 07:11) (93 - 103)  RR: 19 (30 Nov 2023 07:51) (18 - 20)  SpO2: 97% (30 Nov 2023 07:51) (95% - 99%)    Parameters below as of 30 Nov 2023 07:51  Patient On (Oxygen Delivery Method): room air    HYSICAL EXAM:  NERVOUS SYSTEM:  Alert & Oriented X3, Good concentration  CHEST/LUNG: Clear to auscultation bilaterally; No rales, rhonchi, wheezing, or rubs  HEART: Regular rate and rhythm; No murmurs, rubs, or gallops  ABDOMEN: Soft, Nontender, Nondistended; Bowel sounds present  EXTREMITIES:  2+ Peripheral Pulses, No clubbing, cyanosis, or edema  SKIN: right radial cath site without bleeding or hematoma     LABS:                        15.0   11.45 )-----------( 258      ( 30 Nov 2023 06:30 )             44.6     11-30    142  |  109<H>  |  19  ----------------------------<  112<H>  3.8   |  25  |  1.20    Ca    8.6      30 Nov 2023 06:30  Mg     2.2     11-30    TPro  7.4  /  Alb  3.6  /  TBili  1.1  /  DBili  x   /  AST  33  /  ALT  29  /  AlkPhos  60  11-29    PT/INR - ( 29 Nov 2023 22:07 )   PT: 10.5 sec;   INR: 0.93 ratio         PTT - ( 30 Nov 2023 06:30 )  PTT:63.7 sec  Urinalysis Basic - ( 30 Nov 2023 06:30 )    Color: x / Appearance: x / SG: x / pH: x  Gluc: 112 mg/dL / Ketone: x  / Bili: x / Urobili: x   Blood: x / Protein: x / Nitrite: x   Leuk Esterase: x / RBC: x / WBC x   Sq Epi: x / Non Sq Epi: x / Bacteria: x

## 2024-03-13 NOTE — ED PROVIDER NOTE - CONSIDERATION OF ADMISSION OBSERVATION
Consideration of Admission/Observation We will place on observation for pain control.  We will consider admission if pain does not improve.

## 2024-03-13 NOTE — ED ADULT TRIAGE NOTE - CHIEF COMPLAINT QUOTE
Pt BIBEMS from work complaining of sickle cell pain. PT complaining of right sided pain from flank to shoulder. Pt with sob and chest discomfort. Pt states that he last took percocet at 2am.

## 2024-03-13 NOTE — ED PROVIDER NOTE - OBJECTIVE STATEMENT
Pt is a 34yo M with a h/o sickle cell disease who p/w R flank pain.  Pt reports pain is consistent with a sickle cell crisis.  Pain is sharp and located over L chest/flank/back.  Associated with some SOB.  Pain started two nights ago but was mild.  Improved with ibuprofen and percocet, however returned last night and progressively worsened throughout day.  Pt took a percocet at 2am and Ibuprofen at 11am - no relief.  Pt reports h/o gall stones.  Denies any PSH.  Reports crisis is similar to last visit here in 09/2021.  Reports he improved after three doses of Dilaudid.      He notes mild fever here.  Reports his crises usually are triggered when he is fighting off a cold or similar illness.  Denies any sore throat, cough, N/V/D, dysuria, rash, ot any other complaints.

## 2024-03-13 NOTE — ED ADULT NURSE NOTE - NS ED NURSE LEVEL OF CONSCIOUSNESS ORIENTATION
Airway    Date/Time: 2/16/2024 11:20 AM    Performed by: Roya Stover M.D.  Authorized by: Roya Stover M.D.    Location:  OR  Urgency:  Elective  Indications for Airway Management:  Anesthesia      Spontaneous Ventilation: absent    Sedation Level:  Deep  Preoxygenated: Yes    Patient Position:  Sniffing  Final Airway Type:  Endotracheal airway  Final Endotracheal Airway:  ETT  Cuffed: Yes    Technique Used for Successful ETT Placement:  Direct laryngoscopy  Devices/Methods Used in Placement:  Anterior pressure/BURP and intubating stylet    Insertion Site:  Oral  Blade Type:  Balaji  Laryngoscope Blade/Videolaryngoscope Blade Size:  3  ETT Size (mm):  7.0  Measured from:  Teeth  ETT to Teeth (cm):  20  Placement Verified by: auscultation and capnometry    Cormack-Lehane Classification:  Grade IIa - partial view of glottis  Number of Attempts at Approach:  1        
Oriented - self; Oriented - place; Oriented - time

## 2024-03-13 NOTE — ED CDU PROVIDER INITIAL DAY NOTE - DETAILS
Patient is on observation for sickle cell pain crisis.  He will need serial exams and parenteral pain medication prn pain.

## 2024-03-14 ENCOUNTER — INPATIENT (INPATIENT)
Facility: HOSPITAL | Age: 34
LOS: 3 days | Discharge: HOME | DRG: 871 | End: 2024-03-18
Attending: HOSPITALIST | Admitting: HOSPITALIST
Payer: COMMERCIAL

## 2024-03-14 VITALS
OXYGEN SATURATION: 92 % | HEART RATE: 94 BPM | RESPIRATION RATE: 18 BRPM | WEIGHT: 250 LBS | DIASTOLIC BLOOD PRESSURE: 80 MMHG | TEMPERATURE: 99 F | SYSTOLIC BLOOD PRESSURE: 135 MMHG | HEIGHT: 73 IN

## 2024-03-14 VITALS
DIASTOLIC BLOOD PRESSURE: 66 MMHG | SYSTOLIC BLOOD PRESSURE: 134 MMHG | RESPIRATION RATE: 19 BRPM | HEART RATE: 84 BPM | OXYGEN SATURATION: 98 % | TEMPERATURE: 99 F

## 2024-03-14 DIAGNOSIS — J18.9 PNEUMONIA, UNSPECIFIED ORGANISM: ICD-10-CM

## 2024-03-14 DIAGNOSIS — D57.1 SICKLE-CELL DISEASE WITHOUT CRISIS: ICD-10-CM

## 2024-03-14 DIAGNOSIS — Z29.9 ENCOUNTER FOR PROPHYLACTIC MEASURES, UNSPECIFIED: ICD-10-CM

## 2024-03-14 DIAGNOSIS — J96.01 ACUTE RESPIRATORY FAILURE WITH HYPOXIA: ICD-10-CM

## 2024-03-14 DIAGNOSIS — A41.9 SEPSIS, UNSPECIFIED ORGANISM: ICD-10-CM

## 2024-03-14 DIAGNOSIS — D57.00 HB-SS DISEASE WITH CRISIS, UNSPECIFIED: ICD-10-CM

## 2024-03-14 LAB
ALBUMIN SERPL ELPH-MCNC: 3.1 G/DL — LOW (ref 3.4–5)
ALBUMIN SERPL ELPH-MCNC: 3.7 G/DL — SIGNIFICANT CHANGE UP (ref 3.3–5)
ALP SERPL-CCNC: 80 U/L — SIGNIFICANT CHANGE UP (ref 40–120)
ALP SERPL-CCNC: 82 U/L — SIGNIFICANT CHANGE UP (ref 40–120)
ALT FLD-CCNC: 74 U/L — HIGH (ref 10–45)
ALT FLD-CCNC: 90 U/L — HIGH (ref 12–42)
ANION GAP SERPL CALC-SCNC: 7 MMOL/L — LOW (ref 9–16)
ANION GAP SERPL CALC-SCNC: 8 MMOL/L — SIGNIFICANT CHANGE UP (ref 5–17)
ANISOCYTOSIS BLD QL: SLIGHT — SIGNIFICANT CHANGE UP
AST SERPL-CCNC: 60 U/L — HIGH (ref 10–40)
AST SERPL-CCNC: 73 U/L — HIGH (ref 15–37)
BASOPHILS # BLD AUTO: 0 K/UL — SIGNIFICANT CHANGE UP (ref 0–0.2)
BASOPHILS NFR BLD AUTO: 0 % — SIGNIFICANT CHANGE UP (ref 0–2)
BILIRUB SERPL-MCNC: 7.3 MG/DL — HIGH (ref 0.2–1.2)
BILIRUB SERPL-MCNC: 8.4 MG/DL — HIGH (ref 0.2–1.2)
BLD GP AB SCN SERPL QL: NEGATIVE — SIGNIFICANT CHANGE UP
BUN SERPL-MCNC: 11 MG/DL — SIGNIFICANT CHANGE UP (ref 7–23)
BUN SERPL-MCNC: 12 MG/DL — SIGNIFICANT CHANGE UP (ref 7–23)
CALCIUM SERPL-MCNC: 8.6 MG/DL — SIGNIFICANT CHANGE UP (ref 8.5–10.5)
CALCIUM SERPL-MCNC: 8.9 MG/DL — SIGNIFICANT CHANGE UP (ref 8.4–10.5)
CHLORIDE SERPL-SCNC: 100 MMOL/L — SIGNIFICANT CHANGE UP (ref 96–108)
CHLORIDE SERPL-SCNC: 103 MMOL/L — SIGNIFICANT CHANGE UP (ref 96–108)
CK MB CFR SERPL CALC: 1.7 NG/ML — SIGNIFICANT CHANGE UP (ref 0–6.7)
CK SERPL-CCNC: 125 U/L — SIGNIFICANT CHANGE UP (ref 30–200)
CO2 SERPL-SCNC: 26 MMOL/L — SIGNIFICANT CHANGE UP (ref 22–31)
CO2 SERPL-SCNC: 29 MMOL/L — SIGNIFICANT CHANGE UP (ref 22–31)
CREAT SERPL-MCNC: 0.96 MG/DL — SIGNIFICANT CHANGE UP (ref 0.5–1.3)
CREAT SERPL-MCNC: 1.07 MG/DL — SIGNIFICANT CHANGE UP (ref 0.5–1.3)
CULTURE RESULTS: NO GROWTH — SIGNIFICANT CHANGE UP
D DIMER BLD IA.RAPID-MCNC: 610 NG/ML DDU — HIGH
EGFR: 108 ML/MIN/1.73M2 — SIGNIFICANT CHANGE UP
EGFR: 95 ML/MIN/1.73M2 — SIGNIFICANT CHANGE UP
EOSINOPHIL # BLD AUTO: 0 K/UL — SIGNIFICANT CHANGE UP (ref 0–0.5)
EOSINOPHIL NFR BLD AUTO: 0 % — SIGNIFICANT CHANGE UP (ref 0–6)
FERRITIN SERPL-MCNC: 732 NG/ML — HIGH (ref 30–400)
GLUCOSE SERPL-MCNC: 102 MG/DL — HIGH (ref 70–99)
GLUCOSE SERPL-MCNC: 113 MG/DL — HIGH (ref 70–99)
HAPTOGLOB SERPL-MCNC: 51 MG/DL — SIGNIFICANT CHANGE UP (ref 34–200)
HCT VFR BLD CALC: 31.6 % — LOW (ref 39–50)
HCT VFR BLD CALC: 32.1 % — LOW (ref 39–50)
HGB BLD-MCNC: 11.3 G/DL — LOW (ref 13–17)
HGB BLD-MCNC: 11.5 G/DL — LOW (ref 13–17)
HYPOCHROMIA BLD QL: SLIGHT — SIGNIFICANT CHANGE UP
IRON SATN MFR SERPL: 17 % — SIGNIFICANT CHANGE UP (ref 16–55)
IRON SATN MFR SERPL: 34 UG/DL — LOW (ref 45–165)
LDH SERPL L TO P-CCNC: 229 U/L — SIGNIFICANT CHANGE UP (ref 50–242)
LEGIONELLA AG UR QL: NEGATIVE — SIGNIFICANT CHANGE UP
LYMPHOCYTES # BLD AUTO: 11.3 % — LOW (ref 13–44)
LYMPHOCYTES # BLD AUTO: 2.64 K/UL — SIGNIFICANT CHANGE UP (ref 1–3.3)
MAGNESIUM SERPL-MCNC: 1.9 MG/DL — SIGNIFICANT CHANGE UP (ref 1.6–2.6)
MANUAL SMEAR VERIFICATION: SIGNIFICANT CHANGE UP
MCHC RBC-ENTMCNC: 28.3 PG — SIGNIFICANT CHANGE UP (ref 27–34)
MCHC RBC-ENTMCNC: 28.7 PG — SIGNIFICANT CHANGE UP (ref 27–34)
MCHC RBC-ENTMCNC: 35.2 GM/DL — SIGNIFICANT CHANGE UP (ref 32–36)
MCHC RBC-ENTMCNC: 36.4 GM/DL — HIGH (ref 32–36)
MCV RBC AUTO: 78.8 FL — LOW (ref 80–100)
MCV RBC AUTO: 80.5 FL — SIGNIFICANT CHANGE UP (ref 80–100)
MICROCYTES BLD QL: SLIGHT — SIGNIFICANT CHANGE UP
MONOCYTES # BLD AUTO: 5.27 K/UL — HIGH (ref 0–0.9)
MONOCYTES NFR BLD AUTO: 22.6 % — HIGH (ref 2–14)
NEUTROPHILS # BLD AUTO: 15.41 K/UL — HIGH (ref 1.8–7.4)
NEUTROPHILS NFR BLD AUTO: 66.1 % — SIGNIFICANT CHANGE UP (ref 43–77)
NRBC # BLD: 0 /100 WBCS — SIGNIFICANT CHANGE UP (ref 0–0)
OVALOCYTES BLD QL SMEAR: SLIGHT — SIGNIFICANT CHANGE UP
PHOSPHATE SERPL-MCNC: 2.1 MG/DL — LOW (ref 2.5–4.5)
PLAT MORPH BLD: NORMAL — SIGNIFICANT CHANGE UP
PLATELET # BLD AUTO: 427 K/UL — HIGH (ref 150–400)
PLATELET # BLD AUTO: 433 K/UL — HIGH (ref 150–400)
POIKILOCYTOSIS BLD QL AUTO: SIGNIFICANT CHANGE UP
POLYCHROMASIA BLD QL SMEAR: SLIGHT — SIGNIFICANT CHANGE UP
POTASSIUM SERPL-MCNC: 3.8 MMOL/L — SIGNIFICANT CHANGE UP (ref 3.5–5.3)
POTASSIUM SERPL-MCNC: 3.8 MMOL/L — SIGNIFICANT CHANGE UP (ref 3.5–5.3)
POTASSIUM SERPL-SCNC: 3.8 MMOL/L — SIGNIFICANT CHANGE UP (ref 3.5–5.3)
POTASSIUM SERPL-SCNC: 3.8 MMOL/L — SIGNIFICANT CHANGE UP (ref 3.5–5.3)
PROT SERPL-MCNC: 7.3 G/DL — SIGNIFICANT CHANGE UP (ref 6–8.3)
PROT SERPL-MCNC: 7.4 G/DL — SIGNIFICANT CHANGE UP (ref 6.4–8.2)
RAPID RVP RESULT: SIGNIFICANT CHANGE UP
RBC # BLD: 3.99 M/UL — LOW (ref 4.2–5.8)
RBC # BLD: 4.01 M/UL — LOW (ref 4.2–5.8)
RBC # BLD: 4.01 M/UL — LOW (ref 4.2–5.8)
RBC # FLD: 15.6 % — HIGH (ref 10.3–14.5)
RBC # FLD: 15.9 % — HIGH (ref 10.3–14.5)
RBC BLD AUTO: ABNORMAL
RETICS #: 212.5 K/UL — HIGH (ref 25–125)
RETICS/RBC NFR: 5.3 % — HIGH (ref 0.5–2.5)
RH IG SCN BLD-IMP: POSITIVE — SIGNIFICANT CHANGE UP
S PNEUM AG UR QL: NEGATIVE — SIGNIFICANT CHANGE UP
SARS-COV-2 RNA SPEC QL NAA+PROBE: SIGNIFICANT CHANGE UP
SODIUM SERPL-SCNC: 134 MMOL/L — LOW (ref 135–145)
SODIUM SERPL-SCNC: 139 MMOL/L — SIGNIFICANT CHANGE UP (ref 132–145)
SPECIMEN SOURCE: SIGNIFICANT CHANGE UP
TARGETS BLD QL SMEAR: SIGNIFICANT CHANGE UP
TIBC SERPL-MCNC: 202 UG/DL — LOW (ref 220–430)
TROPONIN T, HIGH SENSITIVITY RESULT: 8 NG/L — SIGNIFICANT CHANGE UP (ref 0–51)
UIBC SERPL-MCNC: 168 UG/DL — SIGNIFICANT CHANGE UP (ref 110–370)
WBC # BLD: 23.32 K/UL — HIGH (ref 3.8–10.5)
WBC # BLD: 24.43 K/UL — HIGH (ref 3.8–10.5)
WBC # FLD AUTO: 23.32 K/UL — HIGH (ref 3.8–10.5)
WBC # FLD AUTO: 24.43 K/UL — HIGH (ref 3.8–10.5)

## 2024-03-14 PROCEDURE — 71275 CT ANGIOGRAPHY CHEST: CPT | Mod: 26

## 2024-03-14 PROCEDURE — 99223 1ST HOSP IP/OBS HIGH 75: CPT | Mod: GC

## 2024-03-14 PROCEDURE — 99285 EMERGENCY DEPT VISIT HI MDM: CPT

## 2024-03-14 PROCEDURE — 99223 1ST HOSP IP/OBS HIGH 75: CPT

## 2024-03-14 RX ORDER — FOLIC ACID 0.8 MG
1 TABLET ORAL DAILY
Refills: 0 | Status: DISCONTINUED | OUTPATIENT
Start: 2024-03-14 | End: 2024-03-18

## 2024-03-14 RX ORDER — FOLIC ACID 0.8 MG
1 TABLET ORAL
Refills: 0 | DISCHARGE

## 2024-03-14 RX ORDER — HYDROMORPHONE HYDROCHLORIDE 2 MG/ML
2 INJECTION INTRAMUSCULAR; INTRAVENOUS; SUBCUTANEOUS EVERY 4 HOURS
Refills: 0 | Status: DISCONTINUED | OUTPATIENT
Start: 2024-03-14 | End: 2024-03-14

## 2024-03-14 RX ORDER — AZITHROMYCIN 500 MG/1
500 TABLET, FILM COATED ORAL EVERY 24 HOURS
Refills: 0 | Status: DISCONTINUED | OUTPATIENT
Start: 2024-03-14 | End: 2024-03-15

## 2024-03-14 RX ORDER — HYDROMORPHONE HYDROCHLORIDE 2 MG/ML
30 INJECTION INTRAMUSCULAR; INTRAVENOUS; SUBCUTANEOUS
Refills: 0 | Status: DISCONTINUED | OUTPATIENT
Start: 2024-03-14 | End: 2024-03-14

## 2024-03-14 RX ORDER — POLYETHYLENE GLYCOL 3350 17 G/17G
17 POWDER, FOR SOLUTION ORAL DAILY
Refills: 0 | Status: DISCONTINUED | OUTPATIENT
Start: 2024-03-14 | End: 2024-03-15

## 2024-03-14 RX ORDER — SODIUM CHLORIDE 9 MG/ML
1000 INJECTION, SOLUTION INTRAVENOUS
Refills: 0 | Status: DISCONTINUED | OUTPATIENT
Start: 2024-03-14 | End: 2024-03-15

## 2024-03-14 RX ORDER — CHOLECALCIFEROL (VITAMIN D3) 125 MCG
1 CAPSULE ORAL
Refills: 0 | DISCHARGE

## 2024-03-14 RX ORDER — HYDROMORPHONE HYDROCHLORIDE 2 MG/ML
1 INJECTION INTRAMUSCULAR; INTRAVENOUS; SUBCUTANEOUS EVERY 4 HOURS
Refills: 0 | Status: DISCONTINUED | OUTPATIENT
Start: 2024-03-14 | End: 2024-03-14

## 2024-03-14 RX ORDER — ACETAMINOPHEN 500 MG
650 TABLET ORAL EVERY 6 HOURS
Refills: 0 | Status: DISCONTINUED | OUTPATIENT
Start: 2024-03-14 | End: 2024-03-18

## 2024-03-14 RX ORDER — SODIUM CHLORIDE 9 MG/ML
1000 INJECTION INTRAMUSCULAR; INTRAVENOUS; SUBCUTANEOUS ONCE
Refills: 0 | Status: COMPLETED | OUTPATIENT
Start: 2024-03-14 | End: 2024-03-14

## 2024-03-14 RX ORDER — ONDANSETRON 8 MG/1
4 TABLET, FILM COATED ORAL EVERY 6 HOURS
Refills: 0 | Status: DISCONTINUED | OUTPATIENT
Start: 2024-03-14 | End: 2024-03-18

## 2024-03-14 RX ORDER — CEFTRIAXONE 500 MG/1
2000 INJECTION, POWDER, FOR SOLUTION INTRAMUSCULAR; INTRAVENOUS EVERY 24 HOURS
Refills: 0 | Status: DISCONTINUED | OUTPATIENT
Start: 2024-03-14 | End: 2024-03-16

## 2024-03-14 RX ORDER — PREGABALIN 225 MG/1
1 CAPSULE ORAL
Refills: 0 | DISCHARGE

## 2024-03-14 RX ORDER — HYDROMORPHONE HYDROCHLORIDE 2 MG/ML
1 INJECTION INTRAMUSCULAR; INTRAVENOUS; SUBCUTANEOUS ONCE
Refills: 0 | Status: DISCONTINUED | OUTPATIENT
Start: 2024-03-14 | End: 2024-03-14

## 2024-03-14 RX ORDER — SENNA PLUS 8.6 MG/1
2 TABLET ORAL AT BEDTIME
Refills: 0 | Status: DISCONTINUED | OUTPATIENT
Start: 2024-03-14 | End: 2024-03-18

## 2024-03-14 RX ORDER — NALOXONE HYDROCHLORIDE 4 MG/.1ML
0.1 SPRAY NASAL
Refills: 0 | Status: DISCONTINUED | OUTPATIENT
Start: 2024-03-14 | End: 2024-03-18

## 2024-03-14 RX ORDER — HYDROMORPHONE HYDROCHLORIDE 2 MG/ML
0.5 INJECTION INTRAMUSCULAR; INTRAVENOUS; SUBCUTANEOUS EVERY 4 HOURS
Refills: 0 | Status: DISCONTINUED | OUTPATIENT
Start: 2024-03-14 | End: 2024-03-14

## 2024-03-14 RX ORDER — INFLUENZA VIRUS VACCINE 15; 15; 15; 15 UG/.5ML; UG/.5ML; UG/.5ML; UG/.5ML
0.5 SUSPENSION INTRAMUSCULAR ONCE
Refills: 0 | Status: DISCONTINUED | OUTPATIENT
Start: 2024-03-14 | End: 2024-03-18

## 2024-03-14 RX ORDER — HYDROMORPHONE HYDROCHLORIDE 2 MG/ML
30 INJECTION INTRAMUSCULAR; INTRAVENOUS; SUBCUTANEOUS
Refills: 0 | Status: DISCONTINUED | OUTPATIENT
Start: 2024-03-14 | End: 2024-03-15

## 2024-03-14 RX ORDER — ENOXAPARIN SODIUM 100 MG/ML
40 INJECTION SUBCUTANEOUS EVERY 24 HOURS
Refills: 0 | Status: DISCONTINUED | OUTPATIENT
Start: 2024-03-14 | End: 2024-03-18

## 2024-03-14 RX ADMIN — CEFTRIAXONE 100 MILLIGRAM(S): 500 INJECTION, POWDER, FOR SOLUTION INTRAMUSCULAR; INTRAVENOUS at 11:43

## 2024-03-14 RX ADMIN — ENOXAPARIN SODIUM 40 MILLIGRAM(S): 100 INJECTION SUBCUTANEOUS at 11:42

## 2024-03-14 RX ADMIN — Medication 650 MILLIGRAM(S): at 10:03

## 2024-03-14 RX ADMIN — HYDROMORPHONE HYDROCHLORIDE 2 MILLIGRAM(S): 2 INJECTION INTRAMUSCULAR; INTRAVENOUS; SUBCUTANEOUS at 00:02

## 2024-03-14 RX ADMIN — SODIUM CHLORIDE 1000 MILLILITER(S): 9 INJECTION INTRAMUSCULAR; INTRAVENOUS; SUBCUTANEOUS at 00:01

## 2024-03-14 RX ADMIN — Medication 15 MILLIGRAM(S): at 00:01

## 2024-03-14 RX ADMIN — HYDROMORPHONE HYDROCHLORIDE 1 MILLIGRAM(S): 2 INJECTION INTRAMUSCULAR; INTRAVENOUS; SUBCUTANEOUS at 10:02

## 2024-03-14 RX ADMIN — HYDROMORPHONE HYDROCHLORIDE 2 MILLIGRAM(S): 2 INJECTION INTRAMUSCULAR; INTRAVENOUS; SUBCUTANEOUS at 12:43

## 2024-03-14 RX ADMIN — POLYETHYLENE GLYCOL 3350 17 GRAM(S): 17 POWDER, FOR SOLUTION ORAL at 12:19

## 2024-03-14 RX ADMIN — SODIUM CHLORIDE 80 MILLILITER(S): 9 INJECTION, SOLUTION INTRAVENOUS at 10:23

## 2024-03-14 RX ADMIN — HYDROMORPHONE HYDROCHLORIDE 1 MILLIGRAM(S): 2 INJECTION INTRAMUSCULAR; INTRAVENOUS; SUBCUTANEOUS at 06:10

## 2024-03-14 RX ADMIN — Medication 650 MILLIGRAM(S): at 00:01

## 2024-03-14 RX ADMIN — HYDROMORPHONE HYDROCHLORIDE 2 MILLIGRAM(S): 2 INJECTION INTRAMUSCULAR; INTRAVENOUS; SUBCUTANEOUS at 11:43

## 2024-03-14 RX ADMIN — Medication 650 MILLIGRAM(S): at 21:52

## 2024-03-14 RX ADMIN — HYDROMORPHONE HYDROCHLORIDE 30 MILLILITER(S): 2 INJECTION INTRAMUSCULAR; INTRAVENOUS; SUBCUTANEOUS at 20:54

## 2024-03-14 RX ADMIN — SENNA PLUS 2 TABLET(S): 8.6 TABLET ORAL at 21:19

## 2024-03-14 RX ADMIN — Medication 15 MILLIGRAM(S): at 00:02

## 2024-03-14 RX ADMIN — HYDROMORPHONE HYDROCHLORIDE 1 MILLIGRAM(S): 2 INJECTION INTRAMUSCULAR; INTRAVENOUS; SUBCUTANEOUS at 00:01

## 2024-03-14 RX ADMIN — AZITHROMYCIN 255 MILLIGRAM(S): 500 TABLET, FILM COATED ORAL at 11:43

## 2024-03-14 RX ADMIN — HYDROMORPHONE HYDROCHLORIDE 1 MILLIGRAM(S): 2 INJECTION INTRAMUSCULAR; INTRAVENOUS; SUBCUTANEOUS at 10:30

## 2024-03-14 RX ADMIN — Medication 650 MILLIGRAM(S): at 11:03

## 2024-03-14 RX ADMIN — Medication 325 MILLIGRAM(S): at 00:02

## 2024-03-14 RX ADMIN — HYDROMORPHONE HYDROCHLORIDE 30 MILLILITER(S): 2 INJECTION INTRAMUSCULAR; INTRAVENOUS; SUBCUTANEOUS at 13:20

## 2024-03-14 RX ADMIN — Medication 650 MILLIGRAM(S): at 22:52

## 2024-03-14 RX ADMIN — HYDROMORPHONE HYDROCHLORIDE 1 MILLIGRAM(S): 2 INJECTION INTRAMUSCULAR; INTRAVENOUS; SUBCUTANEOUS at 00:02

## 2024-03-14 RX ADMIN — SODIUM CHLORIDE 1000 MILLILITER(S): 9 INJECTION INTRAMUSCULAR; INTRAVENOUS; SUBCUTANEOUS at 06:39

## 2024-03-14 RX ADMIN — Medication 1 MILLIGRAM(S): at 11:42

## 2024-03-14 RX ADMIN — HYDROMORPHONE HYDROCHLORIDE 1 MILLIGRAM(S): 2 INJECTION INTRAMUSCULAR; INTRAVENOUS; SUBCUTANEOUS at 01:48

## 2024-03-14 NOTE — ED PROVIDER NOTE - PHYSICAL EXAMINATION
VITAL SIGNS: I have reviewed nursing notes and confirm.  CONSTITUTIONAL: Well-developed; well-nourished; in no acute distress.  HEAD: Normocephalic; atraumatic.  EYES: EOM intact; conjunctiva and sclera clear.  ENT: nose appears normal  NECK: Supple  RESP: Breathing comfortably on RA  EXT: Normal ROM. No clubbing, cyanosis or edema.  NEURO: Alert, oriented. Grossly unremarkable.  PSYCH: Cooperative, appropriate.

## 2024-03-14 NOTE — H&P ADULT - NSHPPHYSICALEXAM_GEN_ALL_CORE
GENERAL: NAD, lying in bed comfortably  HEAD:  Atraumatic, Normocephalic  EYES: EOMI, PERRLA, conjunctiva and sclera clear  ENT: Moist mucous membranes  NECK: Supple, No JVD  CHEST/LUNG: Clear to auscultation bilaterally; No rales, rhonchi, wheezing, or rubs. Some increased WOB with speaking.   HEART: Regular rate and rhythm; +Systolic murmur  ABDOMEN: BSx4; Soft, nontender, nondistended  EXTREMITIES:  2+ Peripheral Pulses, brisk capillary refill. No clubbing, cyanosis, or edema  NERVOUS SYSTEM:  A&Ox3, no focal deficits   SKIN: No rashes or lesions

## 2024-03-14 NOTE — CONSULT NOTE ADULT - ASSESSMENT
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 Hematology consulted for management recommendations.    #HbSc disease admitted for pain  This patient has had up to 4 pain crises requiring admission for help with pain control over the last 10 years, with regular follow up with Hematologist Dr. Wilmar Calles. He has never required any exchange transfusions, or medications. His presentation today is similar to prior pain crises in the setting of an underlying infectious trigger.     Peripheral smear ordered, showing___________________________________________________________________  -Continue with IV fluids  -Appreciate palliative recs for pain control  -Infectious workup as per primary team    To be discussed with Dr. 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 Hematology consulted for management recommendations.    #HbSc disease admitted for pain  This patient has had up to 4 pain crises requiring admission for help with pain control over the last 10 years, with regular follow up with Hematologist Dr. Wilmar Calles. He has never required any exchange transfusions, or medications. His presentation today is similar to prior pain crises in the setting of an underlying infectious trigger.     Peripheral smear ordered, showing multiple codocytes, no schistocytes.  -Continue with IV fluids  -Appreciate palliative recs for pain control  -Infectious workup as per primary team, would consider CT abdomen/pelvis if patient's flank pain persists despite pain regimen as abdominal ultrasound revealed cholelithiasis  -hemoglobin electrophoresis, b12, folate (can be obtained with am labs)    discussed with Dr. Piña

## 2024-03-14 NOTE — CONSULT NOTE ADULT - SUBJECTIVE AND OBJECTIVE BOX
Hematology Consult Note    HPI:  33 year old male with PMHx of sickle cell disease presenting with three days of back, thorax, and chest pain, admitted for management of acute pain crisis.     ED COURSE:   Vitals: Tmax 100.4, HR 93 -> 103, /80 - 151/80, RR18, SpO2 92% on room air --> 96% on 2 L NC  Significant Labs: WBC 23.3, Hb 12.3, , Bili 2.6, AST 70, ALT 60  EKG: Not done  Imaging:   -US abd limited: Contracted stone filled gallbladder  Interventions: Hydromorphone 1mg IV push x1, NS 1 L x1   (14 Mar 2024 10:10)    Additional history: Patient reports a history of HbSc, and follows up with Dr. Wilmar Calles. He reports having a total of 3-4 pain crises in the span of 10 years, improved by pain control, triggered by infection. His pain crises typically manifest in his b/l wrists, chest, back, knees and hips.  He has never received any medications and has never required any exchange transfusion.  He reports that he first started experiencing flank pain and dyspnea on exertion on Monday night, which progressively got worse and led to him presenting to the ED. He was managed with Dilaudid, Toradol, Tylenol with temporary relief.     Allergies    No Known Allergies    Intolerances        MEDICATIONS  (STANDING):  azithromycin  IVPB 500 milliGRAM(s) IV Intermittent every 24 hours  cefTRIAXone   IVPB 2000 milliGRAM(s) IV Intermittent every 24 hours  dextrose 5% + sodium chloride 0.3%. 1000 milliLiter(s) (80 mL/Hr) IV Continuous <Continuous>  enoxaparin Injectable 40 milliGRAM(s) SubCutaneous every 24 hours  folic acid 1 milliGRAM(s) Oral daily  HYDROmorphone PCA (1 mG/mL) 30 milliLiter(s) PCA Continuous PCA Continuous  polyethylene glycol 3350 17 Gram(s) Oral daily  senna 2 Tablet(s) Oral at bedtime    MEDICATIONS  (PRN):  acetaminophen     Tablet .. 650 milliGRAM(s) Oral every 6 hours PRN Temp greater or equal to 38C (100.4F), Mild Pain (1 - 3)  naloxone Injectable 0.1 milliGRAM(s) IV Push every 3 minutes PRN For ANY of the following changes in patient status:  A. RR LESS THAN 10 breaths per minute, B. Oxygen saturation LESS THAN 90%, C. Sedation score of 6  ondansetron Injectable 4 milliGRAM(s) IV Push every 6 hours PRN Nausea      PAST MEDICAL & SURGICAL HISTORY:  Sickle cell anemia      No significant past surgical history          FAMILY HISTORY:  Family history of sickle cell trait (Father)    Family history of thalassemia (Mother)        SOCIAL HISTORY: never smoker    REVIEW OF SYSTEMS:    CONSTITUTIONAL: No weakness, fevers or chills  EYES/ENT: No visual changes;  No vertigo or throat pain   NECK: No pain or stiffness  RESPIRATORY: No cough, wheezing, hemoptysis; No shortness of breath  CARDIOVASCULAR: No chest pain or palpitations  GASTROINTESTINAL: No abdominal or epigastric pain. No nausea, vomiting, or hematemesis; No diarrhea or constipation. No melena or hematochezia.  GENITOURINARY: No dysuria, frequency or hematuria  NEUROLOGICAL: No numbness or weakness  SKIN: No itching, burning, rashes, or lesions   All other review of systems is negative unless indicated above.    Height (cm): 185.4 (03-14 @ 08:46)  Weight (kg): 113.4 (03-14 @ 08:46)  BMI (kg/m2): 33 (03-14 @ 08:46)  BSA (m2): 2.37 (03-14 @ 08:46)    T(F): 100.4 (03-14-24 @ 08:46), Max: 101.8 (03-13-24 @ 20:05)  HR: 103 (03-14-24 @ 08:46)  BP: 139/74 (03-14-24 @ 08:46)  RR: 20 (03-14-24 @ 08:46)  SpO2: 94% (03-14-24 @ 08:46)  Wt(kg): --    GENERAL: NAD, well-developed  HEAD:  Atraumatic, Normocephalic  EYES: EOMI, PERRLA, conjunctiva and sclera clear  NECK: Supple, No JVD  CHEST/LUNG: Clear to auscultation bilaterally; No wheeze  HEART: Regular rate and rhythm; No murmurs, rubs, or gallops  ABDOMEN: Soft, Nontender, Nondistended; Bowel sounds present  EXTREMITIES:  2+ Peripheral Pulses, No clubbing, cyanosis, or edema  NEUROLOGY: non-focal  SKIN: No rashes or lesions                          11.5   23.32 )-----------( 427      ( 14 Mar 2024 12:00 )             31.6       03-14    139  |  103  |  12  ----------------------------<  102<H>  3.8   |  29  |  1.07    Ca    8.6      14 Mar 2024 06:26    TPro  7.4  /  Alb  3.1<L>  /  TBili  8.4<H>  /  DBili  x   /  AST  73<H>  /  ALT  90<H>  /  AlkPhos  82  03-14           Hematology Consult Note    HPI:  33 year old male with PMHx of sickle cell disease presenting with three days of back, thorax, and chest pain, admitted for management of acute pain crisis.     ED COURSE:   Vitals: Tmax 100.4, HR 93 -> 103, /80 - 151/80, RR18, SpO2 92% on room air --> 96% on 2 L NC  Significant Labs: WBC 23.3, Hb 12.3, , Bili 2.6, AST 70, ALT 60  EKG: Not done  Imaging:   -US abd limited: Contracted stone filled gallbladder  Interventions: Hydromorphone 1mg IV push x1, NS 1 L x1   (14 Mar 2024 10:10)    Additional history: Patient reports a history of HbSc, and follows up with Dr. Wilmar Calles. He reports having a total of 3-4 pain crises in the span of 10 years, improved by pain control, triggered by infection. His pain crises typically manifest in his b/l wrists, chest, back, knees and hips.  He has never received any medications and has never required any exchange transfusion.  He reports that he first started experiencing flank pain and dyspnea on exertion on Monday night, which progressively got worse and led to him presenting to the ED. He was managed with Dilaudid, Toradol, Tylenol with temporary relief.     Allergies    No Known Allergies    Intolerances        MEDICATIONS  (STANDING):  azithromycin  IVPB 500 milliGRAM(s) IV Intermittent every 24 hours  cefTRIAXone   IVPB 2000 milliGRAM(s) IV Intermittent every 24 hours  dextrose 5% + sodium chloride 0.3%. 1000 milliLiter(s) (80 mL/Hr) IV Continuous <Continuous>  enoxaparin Injectable 40 milliGRAM(s) SubCutaneous every 24 hours  folic acid 1 milliGRAM(s) Oral daily  HYDROmorphone PCA (1 mG/mL) 30 milliLiter(s) PCA Continuous PCA Continuous  polyethylene glycol 3350 17 Gram(s) Oral daily  senna 2 Tablet(s) Oral at bedtime    MEDICATIONS  (PRN):  acetaminophen     Tablet .. 650 milliGRAM(s) Oral every 6 hours PRN Temp greater or equal to 38C (100.4F), Mild Pain (1 - 3)  naloxone Injectable 0.1 milliGRAM(s) IV Push every 3 minutes PRN For ANY of the following changes in patient status:  A. RR LESS THAN 10 breaths per minute, B. Oxygen saturation LESS THAN 90%, C. Sedation score of 6  ondansetron Injectable 4 milliGRAM(s) IV Push every 6 hours PRN Nausea      PAST MEDICAL & SURGICAL HISTORY:  Sickle cell anemia      No significant past surgical history          FAMILY HISTORY:  Family history of sickle cell trait (Father)    Family history of thalassemia (Mother)        SOCIAL HISTORY: never smoker, social EtOH, no illicit drug use    REVIEW OF SYSTEMS:    CONSTITUTIONAL: No weakness, or chills, +fever  EYES/ENT: No visual changes;  No vertigo or throat pain   NECK: No pain or stiffness  RESPIRATORY: No cough, wheezing, hemoptysis; +shortness of breath, worse on exertion, +pain with inspiration  CARDIOVASCULAR: No chest pain or palpitations, +chest pain  GASTROINTESTINAL: No nausea, vomiting, or hematemesis; No diarrhea or constipation. No melena or hematochezia. +abdominal, flank pain  GENITOURINARY: No dysuria, frequency or hematuria  NEUROLOGICAL: No numbness or weakness  MUSCULOSKELETAL: +back pain  SKIN: No itching, burning, rashes, or lesions   All other review of systems is negative unless indicated above.    Height (cm): 185.4 (03-14 @ 08:46)  Weight (kg): 113.4 (03-14 @ 08:46)  BMI (kg/m2): 33 (03-14 @ 08:46)  BSA (m2): 2.37 (03-14 @ 08:46)    T(F): 100.4 (03-14-24 @ 08:46), Max: 101.8 (03-13-24 @ 20:05)  HR: 103 (03-14-24 @ 08:46)  BP: 139/74 (03-14-24 @ 08:46)  RR: 20 (03-14-24 @ 08:46)  SpO2: 94% (03-14-24 @ 08:46)  Wt(kg): --    GENERAL: stoic affect, sitting upright in bed  HEAD:  Atraumatic, Normocephalic  EYES: EOMI, PERRLA, conjunctiva and sclera clear  NECK: Supple, No JVD  CHEST/LUNG: Clear to auscultation bilaterally; bibasilar crackles  HEART: tachycardic  ABDOMEN: Soft, Nontender, Nondistended; Bowel sounds present  EXTREMITIES:  2+ Peripheral Pulses, No clubbing, cyanosis, or edema  NEUROLOGY: non-focal  SKIN: No rashes or lesions                          11.5   23.32 )-----------( 427      ( 14 Mar 2024 12:00 )             31.6       03-14    139  |  103  |  12  ----------------------------<  102<H>  3.8   |  29  |  1.07    Ca    8.6      14 Mar 2024 06:26    TPro  7.4  /  Alb  3.1<L>  /  TBili  8.4<H>  /  DBili  x   /  AST  73<H>  /  ALT  90<H>  /  AlkPhos  82  03-14

## 2024-03-14 NOTE — ED ADULT NURSE NOTE - NS ED NURSE RECORD ANOTHER VITAL SIGN
Consent: Written consent obtained.  The risks were reviewed with the patient including but not limited to: burn, pigmentary changes, pain, blistering, scabbing, redness, increased risk of skin cancers, and the remote possibility of scarring. Yes

## 2024-03-14 NOTE — H&P ADULT - PROBLEM SELECTOR PLAN 2
Presenting with 4 days of back, thorax, and chest pain and fever  Consistent with patient's previous pain crises, but more severe than prior episodes     Plan:  -c/w hydromorphone PCA pump and titrate as needed  -c/w home folic acid, vitamin B12, Vitamin D   -c/w D4-0.3% NS at 80ml/hr and titrate as needed   -f/u heme/onc recs   -Monitor for acute chest syndrome Dressing: bandage

## 2024-03-14 NOTE — H&P ADULT - ATTENDING COMMENTS
Pt. seen and examined by me earlier today; I have read Dr. Chan's H&P, I agree w/ her findings and plan of care as documented; I-STOP reviewed; Pt. p/w SCD c/b acute pain crisis; will admit for IVF 1/3NS, pain control w/ Dilaudid PCA; cont. folate; Pt. w/ oral temp 100.4, WBC 23.32; CTA chest reviewed, no e/o PE, but does reveal bibasilar focal atelectasis and/or PNA; will cont. CTX + azithromycin, f/u cultures, and encourage I.S. use; U/S reviewed, shows contracted stone-filled gallbladder; Pt. reports known h/o gallstones, denies postprandial pain, will monitor LFTs; additional work-up and mgmt per Heme-Onc recs, Pt. may benefit from hydroxyurea

## 2024-03-14 NOTE — H&P ADULT - PROBLEM SELECTOR PLAN 3
Pt currently requiring 2-3 L NC, is tachypneic with exertion  CT scan showing bilateral lower lobe consolidations vs atelectasis   Chest pain responsive to dilaudid at this time and cardiac enzymes negative   CT PE negative   Most likely CAP vs pain crisis     Plan:  -c/w oxygen by nasal cannula and wean as tolerated  -Incentive spirometry

## 2024-03-14 NOTE — ED ADULT NURSE NOTE - CHIEF COMPLAINT QUOTE
Pt walked into ER c/o sickle cell body pain. Pt reports being DC from here earlier tonight but the pain has returned. NKDA. [facility] Sickle cell. fed by clinician/spoon

## 2024-03-14 NOTE — H&P ADULT - PROBLEM SELECTOR PLAN 1
Fever to 100.4, , tachypneic at times, hypoxic to low 90s  Source likely CAP vs SIRS response to pain crisis     Plan:  -Treat plan as below   -f/u blood cultures  -Abx as below

## 2024-03-14 NOTE — ED PROVIDER NOTE - OBJECTIVE STATEMENT
33-year-old male with a history of sickle cell disease presenting with recurrence of pain to the left flank and back.  He was seen here last night and treated for the same symptoms.  After several rounds of pain medication, he felt improved and was discharged home.  He states he took a shower and laid down but the pain became more severe so he returned to the ER.  He has no difficulty breathing.  He did have low-grade fevers last night but has had no fevers since.

## 2024-03-14 NOTE — ED CDU PROVIDER DISPOSITION NOTE - PATIENT PORTAL LINK FT
You can access the FollowMyHealth Patient Portal offered by [facility] by registering at the following website: [email or web address]. By joining [facility]’s FollowMyHealth portal, you will also be able to view your health information using other applications (apps) compatible with our system.

## 2024-03-14 NOTE — H&P ADULT - PROBLEM SELECTOR PLAN 6
F: D5W and 0.3% NS at 80ml/hr  E: Replete PRN  N: Regular diet   DVT: Lovenox 40mg daily   Code: FULL

## 2024-03-14 NOTE — H&P ADULT - HISTORY OF PRESENT ILLNESS
33 year old male with PMHx of sickle cell disease presenting with three days of back, thorax, and chest pain, admitted for management of acute pain crisis.     ED COURSE:   Vitals: Tmax 100.4, HR 93 -> 103, /80 - 151/80, RR18, SpO2 92% on room air --> 96% on 2 L NC  Significant Labs: WBC 23.3, Hb 12.3, , Bili 2.6, AST 70, ALT 60  EKG: Not done  Imaging:   -US abd limited: Contracted stone filled gallbladder  Interventions: Hydromorphone 1mg IV push x1, NS 1 L x1   33 year old male with PMHx of sickle cell disease presenting with three days of back, thorax, and chest pain, Patient had URI symptoms about two weeks, took two covid tests which were negative, and his symptoms resolved. Then about three days ago, he started to feel right flank pain and took percocet and ibuprofen, but it continued and the next day he also felt intermittent shortness of breath with exertion. He visited the ED yesterday,     ED COURSE:   Vitals: Tmax 100.4, HR 93 -> 103, /80 - 151/80, RR18, SpO2 92% on room air --> 96% on 2 L NC  Significant Labs: WBC 23.3, Hb 12.3, , Bili 2.6, AST 70, ALT 60  EKG: Not done  Imaging:   -US abd limited: Contracted stone filled gallbladder  Interventions: Hydromorphone 1mg IV push x1, NS 1 L x1   33 year old male with PMHx of sickle cell disease presenting with three days of back, thorax, and chest pain, Patient had URI symptoms about two weeks, took two covid tests which were negative, and his symptoms resolved. Then about three days ago, he started to feel right flank pain and took percocet and ibuprofen, but it continued and the next day he also felt intermittent shortness of breath with exertion. He visited the ED yesterday and was given pain medications which improved his symptoms some, so he returned home, but then felt the pain spreading to left chest, upper back, and abdomen and felt severe shortness of breath, so returned to the ED today. He has had mini-crises that he manages at home a few times per year with percocet and was last hospitalized in 2021 with a crisis. He has never felt pain this severe in his life. Has never had a transfusion or intubation or acute chest syndrome. He has a hematologist who he sees regularly.   Denies fevers, n/v/d, dysuria, weakness/numbness/tingling.     ED COURSE:   Vitals: Tmax 100.4, HR 93 -> 103, /80 - 151/80, RR18, SpO2 92% on room air --> 96% on 2 L NC  Significant Labs: WBC 23.3, Hb 12.3, , Bili 2.6, AST 70, ALT 60  EKG: Not done  Imaging:   -US abd limited: Contracted stone filled gallbladder  Interventions: Hydromorphone 1mg IV push x1, NS 1 L x1

## 2024-03-14 NOTE — ED ADULT NURSE NOTE - CAS EDP DISCH DISPOSITION ADMI
20yM pmhx asthma c/o abd pain x3 days; pt states he has been feeling bloated, waxing and waning, worse at nights as he has large dinners after not eating during the day; associated w/ discrete episodes of LUQ sharp pain and RLQ sharp pain during twisting movements that resolved after seconds. Pt has passed gas and stools. Denies fever/chills, n/v/d, chest pain, SOB.
[facility]

## 2024-03-14 NOTE — H&P ADULT - PROBLEM SELECTOR PLAN 5
Hb 11.5, unknown baseline   MCV 78  Ferritin 732, Tsat 17%  No sign of hemolysis   Never had transfusion in the past    Plan:  -c/w home folic acid 1g  -f/u heme/onc recs  -Maintain active T&S

## 2024-03-14 NOTE — ED CDU PROVIDER DISPOSITION NOTE - NSFOLLOWUPINSTRUCTIONS_ED_ALL_ED_FT
Continue taking your home pain medications as prescribed.  Return to the ER with any difficulty breathing, high fevers, or chest pain.

## 2024-03-14 NOTE — H&P ADULT - ASSESSMENT
33 year old male with PMHx of sickle cell disease presenting with three days of back, thorax, and chest pain, admitted for management of acute pain crisis.    33 year old male with PMHx of sickle cell disease presenting with three days of back, thorax, and chest pain, admitted for management of acute pain crisis, acute hypoxic respiratory failure, and possible CAP.

## 2024-03-14 NOTE — H&P ADULT - NSHPREVIEWOFSYSTEMS_GEN_ALL_CORE
REVIEW OF SYSTEMS:  CONSTITUTIONAL: No weakness, fevers or chills  EYES/ENT: No visual changes;  No vertigo or throat pain   NECK: No pain or stiffness  RESPIRATORY: +SOB  CARDIOVASCULAR: +Back and anterior chest pain, bilateral   GASTROINTESTINAL: No nausea, vomiting, or hematemesis; No diarrhea or constipation. No melena or hematochezia.  GENITOURINARY: No dysuria, frequency or hematuria  NEUROLOGICAL: No numbness or weakness  SKIN: No itching, rashes  +Pain in back, chest, abdomen

## 2024-03-14 NOTE — CHART NOTE - NSCHARTNOTEFT_GEN_A_CORE
Prescription Information      PDI Filter:    PDI	My Rx	Current Rx	Drug Type	Rx Written	Rx Dispensed	Drug	Quantity	Days Supply	Prescriber Name	Prescriber MARK #	Payment Method	Dispenser  A	N	N	O	04/07/2023	04/10/2023	oxycodone-acetaminophen  mg tab	40	10	Gisselle Dowling [id number]	Insurance	[facility] #[id number]

## 2024-03-14 NOTE — ED ADULT TRIAGE NOTE - CHIEF COMPLAINT QUOTE
Pt walked into ER c/o sickle cell body pain. Pt reports being DC from here earlier tonight but the pain has returned. NKDA. [facility] Sickle cell.

## 2024-03-14 NOTE — ED CDU PROVIDER DISPOSITION NOTE - CLINICAL COURSE
Patient turned over to me, Dr. Sorto, at 12:15AM by Dr. Jennings. Patient reports improvement in pain and is able to move around and take deep breaths without difficulty.  He is requesting one more dose of pain medication and would like to go home.  He confirms he will be taking a car service home.  He is observed ambulating around the room and appears to be breathing comfortably.  Labs reviewed and while he does have a leukocytosis, there is no evidence of bacterial infection based on history, exam, or laboratory testing.

## 2024-03-14 NOTE — H&P ADULT - PROBLEM SELECTOR PLAN 4
Possible CAP given CT scan findings of bilateral lower lobe consolidations  Leukocytosis to 22   Fever     Plan:  -CTX 1g daily for 5 days and azithromycin 500mg daily for 3 days  -f/u sputum culture, MRSA swab, urine strep/legionella

## 2024-03-15 ENCOUNTER — TRANSCRIPTION ENCOUNTER (OUTPATIENT)
Age: 34
End: 2024-03-15

## 2024-03-15 DIAGNOSIS — K80.20 CALCULUS OF GALLBLADDER WITHOUT CHOLECYSTITIS WITHOUT OBSTRUCTION: ICD-10-CM

## 2024-03-15 LAB
ADD ON TEST-SPECIMEN IN LAB: SIGNIFICANT CHANGE UP
ADD ON TEST-SPECIMEN IN LAB: SIGNIFICANT CHANGE UP
ALBUMIN SERPL ELPH-MCNC: 3.5 G/DL — SIGNIFICANT CHANGE UP (ref 3.3–5)
ALP SERPL-CCNC: 76 U/L — SIGNIFICANT CHANGE UP (ref 40–120)
ALT FLD-CCNC: 64 U/L — HIGH (ref 10–45)
ANION GAP SERPL CALC-SCNC: 10 MMOL/L — SIGNIFICANT CHANGE UP (ref 5–17)
ANISOCYTOSIS BLD QL: SLIGHT — SIGNIFICANT CHANGE UP
AST SERPL-CCNC: 44 U/L — HIGH (ref 10–40)
BASOPHILS # BLD AUTO: 0 K/UL — SIGNIFICANT CHANGE UP (ref 0–0.2)
BASOPHILS NFR BLD AUTO: 0 % — SIGNIFICANT CHANGE UP (ref 0–2)
BILIRUB DIRECT SERPL-MCNC: 4 MG/DL — HIGH (ref 0–0.3)
BILIRUB INDIRECT FLD-MCNC: 3.2 MG/DL — SIGNIFICANT CHANGE UP (ref 0.2–1)
BILIRUB SERPL-MCNC: 7.2 MG/DL — HIGH (ref 0.2–1.2)
BILIRUB SERPL-MCNC: 7.2 MG/DL — SIGNIFICANT CHANGE UP (ref 0.2–1.2)
BUN SERPL-MCNC: 10 MG/DL — SIGNIFICANT CHANGE UP (ref 7–23)
CALCIUM SERPL-MCNC: 9.3 MG/DL — SIGNIFICANT CHANGE UP (ref 8.4–10.5)
CHLORIDE SERPL-SCNC: 98 MMOL/L — SIGNIFICANT CHANGE UP (ref 96–108)
CO2 SERPL-SCNC: 26 MMOL/L — SIGNIFICANT CHANGE UP (ref 22–31)
CREAT SERPL-MCNC: 1.01 MG/DL — SIGNIFICANT CHANGE UP (ref 0.5–1.3)
EGFR: 102 ML/MIN/1.73M2 — SIGNIFICANT CHANGE UP
EOSINOPHIL # BLD AUTO: 0.26 K/UL — SIGNIFICANT CHANGE UP (ref 0–0.5)
EOSINOPHIL NFR BLD AUTO: 0.9 % — SIGNIFICANT CHANGE UP (ref 0–6)
GIANT PLATELETS BLD QL SMEAR: PRESENT — SIGNIFICANT CHANGE UP
GLUCOSE SERPL-MCNC: 99 MG/DL — SIGNIFICANT CHANGE UP (ref 70–99)
HCT VFR BLD CALC: 31.4 % — LOW (ref 39–50)
HGB BLD-MCNC: 11.4 G/DL — LOW (ref 13–17)
HYPOCHROMIA BLD QL: SLIGHT — SIGNIFICANT CHANGE UP
LYMPHOCYTES # BLD AUTO: 2.74 K/UL — SIGNIFICANT CHANGE UP (ref 1–3.3)
LYMPHOCYTES # BLD AUTO: 9.6 % — LOW (ref 13–44)
MACROCYTES BLD QL: SLIGHT — SIGNIFICANT CHANGE UP
MAGNESIUM SERPL-MCNC: 1.9 MG/DL — SIGNIFICANT CHANGE UP (ref 1.6–2.6)
MANUAL SMEAR VERIFICATION: SIGNIFICANT CHANGE UP
MCHC RBC-ENTMCNC: 28.9 PG — SIGNIFICANT CHANGE UP (ref 27–34)
MCHC RBC-ENTMCNC: 36.3 GM/DL — HIGH (ref 32–36)
MCV RBC AUTO: 79.5 FL — LOW (ref 80–100)
MONOCYTES # BLD AUTO: 4.7 K/UL — HIGH (ref 0–0.9)
MONOCYTES NFR BLD AUTO: 16.5 % — HIGH (ref 2–14)
NEUTROPHILS # BLD AUTO: 20.81 K/UL — HIGH (ref 1.8–7.4)
NEUTROPHILS NFR BLD AUTO: 73 % — SIGNIFICANT CHANGE UP (ref 43–77)
NRBC # BLD: 2 /100 WBCS — HIGH (ref 0–0)
NRBC # BLD: SIGNIFICANT CHANGE UP /100 WBCS (ref 0–0)
PHOSPHATE SERPL-MCNC: 2.5 MG/DL — SIGNIFICANT CHANGE UP (ref 2.5–4.5)
PLAT MORPH BLD: ABNORMAL
PLATELET # BLD AUTO: 424 K/UL — HIGH (ref 150–400)
POIKILOCYTOSIS BLD QL AUTO: SIGNIFICANT CHANGE UP
POLYCHROMASIA BLD QL SMEAR: SLIGHT — SIGNIFICANT CHANGE UP
POTASSIUM SERPL-MCNC: 3.6 MMOL/L — SIGNIFICANT CHANGE UP (ref 3.5–5.3)
POTASSIUM SERPL-SCNC: 3.6 MMOL/L — SIGNIFICANT CHANGE UP (ref 3.5–5.3)
PROCALCITONIN SERPL-MCNC: 0.66 NG/ML — HIGH (ref 0.02–0.1)
PROT SERPL-MCNC: 7.1 G/DL — SIGNIFICANT CHANGE UP (ref 6–8.3)
RBC # BLD: 3.95 M/UL — LOW (ref 4.2–5.8)
RBC # FLD: 15.7 % — HIGH (ref 10.3–14.5)
RBC BLD AUTO: ABNORMAL
SICKLE CELLS BLD QL SMEAR: SLIGHT — SIGNIFICANT CHANGE UP
SODIUM SERPL-SCNC: 134 MMOL/L — LOW (ref 135–145)
SPHEROCYTES BLD QL SMEAR: SLIGHT — SIGNIFICANT CHANGE UP
TARGETS BLD QL SMEAR: SIGNIFICANT CHANGE UP
WBC # BLD: 28.51 K/UL — HIGH (ref 3.8–10.5)
WBC # FLD AUTO: 28.51 K/UL — HIGH (ref 3.8–10.5)

## 2024-03-15 PROCEDURE — 99233 SBSQ HOSP IP/OBS HIGH 50: CPT | Mod: GC

## 2024-03-15 PROCEDURE — 71045 X-RAY EXAM CHEST 1 VIEW: CPT | Mod: 26

## 2024-03-15 PROCEDURE — 99233 SBSQ HOSP IP/OBS HIGH 50: CPT

## 2024-03-15 PROCEDURE — 99232 SBSQ HOSP IP/OBS MODERATE 35: CPT | Mod: GC

## 2024-03-15 RX ORDER — SODIUM CHLORIDE 9 MG/ML
1000 INJECTION, SOLUTION INTRAVENOUS
Refills: 0 | Status: DISCONTINUED | OUTPATIENT
Start: 2024-03-15 | End: 2024-03-16

## 2024-03-15 RX ORDER — SODIUM CHLORIDE 9 MG/ML
1000 INJECTION, SOLUTION INTRAVENOUS
Refills: 0 | Status: DISCONTINUED | OUTPATIENT
Start: 2024-03-15 | End: 2024-03-15

## 2024-03-15 RX ORDER — SODIUM CHLORIDE 9 MG/ML
1000 INJECTION INTRAMUSCULAR; INTRAVENOUS; SUBCUTANEOUS
Refills: 0 | Status: DISCONTINUED | OUTPATIENT
Start: 2024-03-15 | End: 2024-03-15

## 2024-03-15 RX ORDER — HYDROMORPHONE HYDROCHLORIDE 2 MG/ML
30 INJECTION INTRAMUSCULAR; INTRAVENOUS; SUBCUTANEOUS
Refills: 0 | Status: DISCONTINUED | OUTPATIENT
Start: 2024-03-15 | End: 2024-03-17

## 2024-03-15 RX ORDER — POLYETHYLENE GLYCOL 3350 17 G/17G
17 POWDER, FOR SOLUTION ORAL EVERY 12 HOURS
Refills: 0 | Status: DISCONTINUED | OUTPATIENT
Start: 2024-03-15 | End: 2024-03-18

## 2024-03-15 RX ADMIN — POLYETHYLENE GLYCOL 3350 17 GRAM(S): 17 POWDER, FOR SOLUTION ORAL at 22:22

## 2024-03-15 RX ADMIN — ENOXAPARIN SODIUM 40 MILLIGRAM(S): 100 INJECTION SUBCUTANEOUS at 11:16

## 2024-03-15 RX ADMIN — AZITHROMYCIN 255 MILLIGRAM(S): 500 TABLET, FILM COATED ORAL at 11:15

## 2024-03-15 RX ADMIN — CEFTRIAXONE 100 MILLIGRAM(S): 500 INJECTION, POWDER, FOR SOLUTION INTRAMUSCULAR; INTRAVENOUS at 11:15

## 2024-03-15 RX ADMIN — POLYETHYLENE GLYCOL 3350 17 GRAM(S): 17 POWDER, FOR SOLUTION ORAL at 11:16

## 2024-03-15 RX ADMIN — HYDROMORPHONE HYDROCHLORIDE 30 MILLILITER(S): 2 INJECTION INTRAMUSCULAR; INTRAVENOUS; SUBCUTANEOUS at 17:42

## 2024-03-15 RX ADMIN — HYDROMORPHONE HYDROCHLORIDE 30 MILLILITER(S): 2 INJECTION INTRAMUSCULAR; INTRAVENOUS; SUBCUTANEOUS at 16:34

## 2024-03-15 RX ADMIN — Medication 650 MILLIGRAM(S): at 16:56

## 2024-03-15 RX ADMIN — Medication 650 MILLIGRAM(S): at 15:14

## 2024-03-15 RX ADMIN — SENNA PLUS 2 TABLET(S): 8.6 TABLET ORAL at 22:22

## 2024-03-15 RX ADMIN — Medication 1 MILLIGRAM(S): at 11:15

## 2024-03-15 RX ADMIN — Medication 650 MILLIGRAM(S): at 06:18

## 2024-03-15 RX ADMIN — SODIUM CHLORIDE 80 MILLILITER(S): 9 INJECTION, SOLUTION INTRAVENOUS at 01:09

## 2024-03-15 RX ADMIN — SODIUM CHLORIDE 125 MILLILITER(S): 9 INJECTION, SOLUTION INTRAVENOUS at 16:35

## 2024-03-15 NOTE — DISCHARGE NOTE PROVIDER - PROVIDER TOKENS
PROVIDER:[TOKEN:[[id number]],FOLLOWUP:[2 weeks]] FREE:[LAST:[Neddt],FIRST:[Wilmar],PHONE:[(   )    -],FAX:[(   )    -],SCHEDULEDAPPT:[03/22/2024],ESTABLISHEDPATIENT:[T]]

## 2024-03-15 NOTE — PROGRESS NOTE ADULT - SUBJECTIVE AND OBJECTIVE BOX
INTERVAL HPI/OVERNIGHT EVENTS:    SUBJECTIVE: Patient seen and examined at bedside.    VITAL SIGNS:  ICU Vital Signs Last 24 Hrs  T(C): 37.7 (15 Mar 2024 18:28), Max: 38.2 (14 Mar 2024 21:30)  T(F): 99.9 (15 Mar 2024 18:28), Max: 100.8 (14 Mar 2024 21:30)  HR: 97 (15 Mar 2024 18:28) (97 - 106)  BP: 129/73 (15 Mar 2024 18:28) (124/75 - 133/73)  BP(mean): --  ABP: --  ABP(mean): --  RR: 19 (15 Mar 2024 18:28) (18 - 20)  SpO2: 95% (15 Mar 2024 18:28) (88% - 95%)    O2 Parameters below as of 15 Mar 2024 18:28  Patient On (Oxygen Delivery Method): nasal cannula  O2 Flow (L/min): 5            CAPILLARY BLOOD GLUCOSE          PHYSICAL EXAM:    Constitutional: NAD, sitting in chair  HEENT: NC/AT; PERRL, anicteric sclera; MMM  Neck: supple, no JVD  Cardiovascular: +S1/S2, RRR  Respiratory: no iwob when sitting, not evaluated when ambulatory  Gastrointestinal: abdomen soft, NT/ND; no rebound or guarding; +BSx4  Genitourinary: no suprapubic tenderness or fullness  Extremities: WWP; no LE edema; no clubbing or cyanosis  Vascular: 2+ radial, DP/PT and femoral pulses B/L  Dermatologic: normal color and turgor; no visible rashes  Neurological: AAOx3; nonfocal    MEDICATIONS:  MEDICATIONS  (STANDING):  cefTRIAXone   IVPB 2000 milliGRAM(s) IV Intermittent every 24 hours  enoxaparin Injectable 40 milliGRAM(s) SubCutaneous every 24 hours  folic acid 1 milliGRAM(s) Oral daily  HYDROmorphone PCA (1 mG/mL) 30 milliLiter(s) PCA Continuous PCA Continuous  influenza   Vaccine 0.5 milliLiter(s) IntraMuscular once  polyethylene glycol 3350 17 Gram(s) Oral every 12 hours  senna 2 Tablet(s) Oral at bedtime  sodium chloride 0.45%. 1000 milliLiter(s) (125 mL/Hr) IV Continuous <Continuous>    MEDICATIONS  (PRN):  acetaminophen     Tablet .. 650 milliGRAM(s) Oral every 6 hours PRN Temp greater or equal to 38C (100.4F), Mild Pain (1 - 3)  naloxone Injectable 0.1 milliGRAM(s) IV Push every 3 minutes PRN For ANY of the following changes in patient status:  A. RR LESS THAN 10 breaths per minute, B. Oxygen saturation LESS THAN 90%, C. Sedation score of 6  ondansetron Injectable 4 milliGRAM(s) IV Push every 6 hours PRN Nausea      ALLERGIES:  Allergies    No Known Allergies    Intolerances        LABS:                        11.4   28.51 )-----------( 424      ( 15 Mar 2024 07:20 )             31.4     03-15    134<L>  |  98  |  10  ----------------------------<  99  3.6   |  26  |  1.01    Ca    9.3      15 Mar 2024 07:20  Phos  2.5     03-15  Mg     1.9     03-15    TPro  7.1  /  Alb  3.5  /  TBili  7.2<H>  /  DBili  x   /  AST  44<H>  /  ALT  64<H>  /  AlkPhos  76  03-15      Urinalysis Basic - ( 15 Mar 2024 07:20 )    Color: x / Appearance: x / SG: x / pH: x  Gluc: 99 mg/dL / Ketone: x  / Bili: x / Urobili: x   Blood: x / Protein: x / Nitrite: x   Leuk Esterase: x / RBC: x / WBC x   Sq Epi: x / Non Sq Epi: x / Bacteria: x        RADIOLOGY & ADDITIONAL TESTS: Reviewed.   INTERVAL HPI/OVERNIGHT EVENTS:    SUBJECTIVE: Patient seen and examined at bedside.    VITAL SIGNS:  ICU Vital Signs Last 24 Hrs  T(C): 37.7 (15 Mar 2024 18:28), Max: 38.2 (14 Mar 2024 21:30)  T(F): 99.9 (15 Mar 2024 18:28), Max: 100.8 (14 Mar 2024 21:30)  HR: 97 (15 Mar 2024 18:28) (97 - 106)  BP: 129/73 (15 Mar 2024 18:28) (124/75 - 133/73)  BP(mean): --  ABP: --  ABP(mean): --  RR: 19 (15 Mar 2024 18:28) (18 - 20)  SpO2: 95% (15 Mar 2024 18:28) (88% - 95%)    O2 Parameters below as of 15 Mar 2024 18:28  Patient On (Oxygen Delivery Method): nasal cannula  O2 Flow (L/min): 5            CAPILLARY BLOOD GLUCOSE          PHYSICAL EXAM:    Constitutional: NAD, sitting in chair  HEENT: NC/AT; PERRL, +scleral icterus; MMM  Neck: supple, no JVD  Cardiovascular: +S1/S2, RRR  Respiratory: no iwob when sitting, not evaluated when ambulatory  Gastrointestinal: abdomen soft, NT/ND; no rebound or guarding; +BSx4  Genitourinary: no suprapubic tenderness or fullness  Extremities: WWP; no LE edema; no clubbing or cyanosis  Vascular: 2+ radial, DP/PT and femoral pulses B/L  Dermatologic: normal color and turgor; no visible rashes  Neurological: AAOx3; nonfocal    MEDICATIONS:  MEDICATIONS  (STANDING):  cefTRIAXone   IVPB 2000 milliGRAM(s) IV Intermittent every 24 hours  enoxaparin Injectable 40 milliGRAM(s) SubCutaneous every 24 hours  folic acid 1 milliGRAM(s) Oral daily  HYDROmorphone PCA (1 mG/mL) 30 milliLiter(s) PCA Continuous PCA Continuous  influenza   Vaccine 0.5 milliLiter(s) IntraMuscular once  polyethylene glycol 3350 17 Gram(s) Oral every 12 hours  senna 2 Tablet(s) Oral at bedtime  sodium chloride 0.45%. 1000 milliLiter(s) (125 mL/Hr) IV Continuous <Continuous>    MEDICATIONS  (PRN):  acetaminophen     Tablet .. 650 milliGRAM(s) Oral every 6 hours PRN Temp greater or equal to 38C (100.4F), Mild Pain (1 - 3)  naloxone Injectable 0.1 milliGRAM(s) IV Push every 3 minutes PRN For ANY of the following changes in patient status:  A. RR LESS THAN 10 breaths per minute, B. Oxygen saturation LESS THAN 90%, C. Sedation score of 6  ondansetron Injectable 4 milliGRAM(s) IV Push every 6 hours PRN Nausea      ALLERGIES:  Allergies    No Known Allergies    Intolerances        LABS:                        11.4   28.51 )-----------( 424      ( 15 Mar 2024 07:20 )             31.4     03-15    134<L>  |  98  |  10  ----------------------------<  99  3.6   |  26  |  1.01    Ca    9.3      15 Mar 2024 07:20  Phos  2.5     03-15  Mg     1.9     03-15    TPro  7.1  /  Alb  3.5  /  TBili  7.2<H>  /  DBili  x   /  AST  44<H>  /  ALT  64<H>  /  AlkPhos  76  03-15      Urinalysis Basic - ( 15 Mar 2024 07:20 )    Color: x / Appearance: x / SG: x / pH: x  Gluc: 99 mg/dL / Ketone: x  / Bili: x / Urobili: x   Blood: x / Protein: x / Nitrite: x   Leuk Esterase: x / RBC: x / WBC x   Sq Epi: x / Non Sq Epi: x / Bacteria: x        RADIOLOGY & ADDITIONAL TESTS: Reviewed.

## 2024-03-15 NOTE — PROGRESS NOTE ADULT - ASSESSMENT
33 year old male with PMHx of sickle cell disease presenting with three days of back, thorax, and chest pain, admitted for management of acute pain crisis, acute hypoxic respiratory failure, and possible CAP.    33 year old male with PMHx of sickle cell disease presenting with three days of back, thorax, and chest pain, admitted for management of acute pain crisis, acute hypoxic respiratory failure, and possible CAP. Due to repeat CXR showing worsening atelectasis vs pneumonia iso uptitration of PCA pump and increasing 02 requirements ICU was consulted for transfer to [facility] for closer monitoring.

## 2024-03-15 NOTE — DISCHARGE NOTE PROVIDER - NSDCCPTREATMENT_GEN_ALL_CORE_FT
PRINCIPAL PROCEDURE  Procedure: CT chest w con  Findings and Treatment: IMPRESSION:  No pulmonary embolism. Evaluation of subsegmental branches is limited   secondary to suboptimal contrast bolus. Bibasilar focal atelectasis and/or pneumonia.        SECONDARY PROCEDURE  Procedure: Ultrasound of upper abdomen  Findings and Treatment: IMPRESSION:  Contracted stone filled gallbladder       PRINCIPAL PROCEDURE  Procedure: CT chest w con  Findings and Treatment: IMPRESSION:  1.  No hydronephrosis. Right kidney appears slightly more full/swollen   compared to prior ultrasound from 3/13/2024. Ultrasound is limited for   evaluation of pyelonephritis.  2.  Mild hepatomegaly. No focal hepatic lesion.  3.  Normal size spleen with heterogeneous appearance.  4.  Cholelithiasis. No evidence of acute cholecystitis.  5.  Small bilateral pleural effusions.      SECONDARY PROCEDURE  Procedure: Ultrasound of upper abdomen  Findings and Treatment: IMPRESSION:  Contracted stone filled gallbladder

## 2024-03-15 NOTE — PROGRESS NOTE ADULT - PROBLEM SELECTOR PLAN 2
Presenting with 4 days of back, thorax, and chest pain and fever  Consistent with patient's previous pain crises, but more severe than prior episodes     Plan:  -c/w hydromorphone PCA pump and titrate as needed  -c/w home folic acid, vitamin B12, Vitamin D   -c/w D4-0.3% NS at 80ml/hr and titrate as needed   -f/u heme/onc recs   -Monitor for acute chest syndrome Presenting with 4 days of back, thorax, and chest pain and fever  Consistent with patient's previous pain crises, but more severe than prior episodes     Plan:  -c/w hydromorphone PCA pump and titrate as needed  -c/w home folic acid, vitamin B12, Vitamin D   -c/w 0.45% NS at 125 ml/hr and titrate as needed   -Monitor for acute chest syndrome  -f/u repeat CXR Presenting with 4 days of back, thorax, and chest pain and fever  Consistent with patient's previous pain crises, but more severe than prior episodes     Plan:  - c/w hydromorphone PCA pump and titrate as needed  - c/w home folic acid, vitamin B12, Vitamin D   - c/w 0.45% NS at 125 ml/hr and titrate as needed   - Monitor for acute chest syndrome  - f/u repeat CXR

## 2024-03-15 NOTE — PROGRESS NOTE ADULT - PROBLEM SELECTOR PLAN 1
Fever to 100.4, , tachypneic at times, hypoxic to low 90s  Source likely CAP vs SIRS response to pain crisis     Plan:  -Treat plan as below   -f/u blood cultures  -Abx as below Fever to 100.4, , tachypneic at times, hypoxic to low 90s  Source likely CAP vs SIRS response to pain crisis   - RVP negative    Plan:  -treat plan as below   -f/u blood cultures  -abx as below Fever to 100.4, , tachypneic at times, hypoxic to low 90s  Source likely CAP vs SIRS response to pain crisis   - RVP negative, strep/legionella negative, MRSA negative     Plan:  - treat plan as below   - f/u blood cultures  - abx as below

## 2024-03-15 NOTE — DISCHARGE NOTE PROVIDER - HOSPITAL COURSE
#Discharge: do not delete    33 year old male with PMHx of sickle cell disease presenting with three days of back, thorax, and chest pain, admitted for management of acute pain crisis, acute hypoxic respiratory failure, and possible CAP.     Hospital course (by problem):     Patient was discharged to: (home/[facility]/acute rehab/hospice, etc, and with what services – home health PT/RN? Home O2?)    New medications:   Changes to old medications:  Medications that were stopped:    Items to follow up as outpatient:    Physical exam at the time of discharge:       #Discharge: do not delete    33 year old male with PMHx of sickle cell disease presenting with three days of back, thorax, and chest pain, admitted for management of acute pain crisis, acute hypoxic respiratory failure, and possible CAP.     Hospital course (by problem):     #Sepsis.   #CAP (community acquired pneumonia).   Fever to 100.4, , tachypneic at times, hypoxic to low 90s  Source likely CAP vs SIRS response to pain crisis   - RVP negative  - treat plan as below   - f/u blood cultures  - CTX 1g daily for 5 days (3/15 - 3/19)    #Sickle cell pain crisis.   Presenting with 4 days of back, thorax, and chest pain and fever. Consistent with patient's previous pain crises, but more severe than prior episodes   -c/w hydromorphone PCA pump and titrate as needed  -c/w home folic acid, vitamin B12, Vitamin D   -c/w 0.45% NS at 125 ml/hr and titrate as needed     #Acute hypoxic respiratory failure.   - CT scan showing bilateral lower lobe consolidations vs atelectasis   - Chest pain responsive to dilaudid at this time and cardiac enzymes negative   - CT PE negative   -c/w oxygen by nasal cannula and wean as tolerated  -Incentive spirometry.    #Sickle cell anemia.   Hb 11.5, unknown baseline; No sign of hemolysis   Never had transfusion in the past  -c/w home folic acid 1g    Patient was discharged to: home     New medications:   Changes to old medications:  Medications that were stopped:    Items to follow up as outpatient:    Physical exam at the time of discharge:       #Discharge: do not delete    33 year old male with PMHx of sickle cell disease presenting with three days of back, thorax, and chest pain, admitted for management of acute pain crisis, acute hypoxic respiratory failure, and possible CAP.     Hospital course (by problem):     #Sepsis.   #CAP (community acquired pneumonia).   Fever to 100.4, , tachypneic at times, hypoxic to low 90s  Source likely CAP vs SIRS response to pain crisis   - CTX 1g daily for 5 days (3/15 - 3/19)    #Sickle cell pain crisis.   Presenting with 4 days of back, thorax, and chest pain and fever. Consistent with patient's previous pain crises, but more severe than prior episodes   - C/w home folic acid, vitamin B12, Vitamin D     #Acute hypoxic respiratory failure.   - CT scan showing bilateral lower lobe consolidations vs atelectasis   - C/w oxygen by nasal cannula and wean as tolerated  - Incentive spirometry.    #Sickle cell anemia.   Hb 11.5, unknown baseline; No sign of hemolysis   Never had transfusion in the past  - C/w home folic acid 1g    Patient was discharged to: home     New medications:   Changes to old medications:  Medications that were stopped:    Items to follow up as outpatient:  1. Follow up with hematologist     Physical exam at the time of discharge:    General: in no acute distress, resting comfortably on 2L NC  HENT: icteric sclera   Lungs: CTA B/L. No wheezes, rales, or rhonchi  Cardiovascular: RRR. No murmurs, rubs, or gallops  Abdomen: Soft, non-tender non-distended; No rebound or guarding  Extremities: WWP, No clubbing, cyanosis or edema  Neurological: Alert and oriented x3  Skin: Warm and dry. No obvious rash   Psych: normal affect       #Discharge: do not delete    33 year old male with PMHx of sickle cell disease presenting with three days of back, thorax, and chest pain, admitted for management of acute pain crisis, acute hypoxic respiratory failure, and possible CAP.     Hospital course (by problem):     #Sepsis.   #CAP (community acquired pneumonia).   Fever to 100.4, , tachypneic at times, hypoxic to low 90s  Source likely CAP vs SIRS response to pain crisis   - CTX 1g daily for 5 days (3/15 - 3/19)    #Sickle cell pain crisis.   Presenting with 4 days of back, thorax, and chest pain and fever. Consistent with patient's previous pain crises, but more severe than prior episodes   - C/w home folic acid, vitamin B12, Vitamin D     #Acute hypoxic respiratory failure.   - CT scan showing bilateral lower lobe consolidations vs atelectasis   - C/w oxygen by nasal cannula and wean as tolerated  - Incentive spirometry.    #Sickle cell anemia.   Hb 11.5, unknown baseline; No sign of hemolysis   Never had transfusion in the past  - C/w home folic acid 1g    Patient was discharged to: home     New medications:   Changes to old medications:  Medications that were stopped:    Items to follow up as outpatient:  1. Follow up with hematologist   2. Continue to take cefpodoxime on discharge on 3/19    Physical exam at the time of discharge:    General: in no acute distress, resting comfortably on 2L NC  HENT: icteric sclera   Lungs: CTA B/L. No wheezes, rales, or rhonchi  Cardiovascular: RRR. No murmurs, rubs, or gallops  Abdomen: Soft, non-tender non-distended; No rebound or guarding  Extremities: WWP, No clubbing, cyanosis or edema  Neurological: Alert and oriented x3  Skin: Warm and dry. No obvious rash   Psych: normal affect       #Discharge: do not delete    33 year old male with PMHx of sickle cell disease presenting with three days of back, thorax, and chest pain, admitted for management of acute pain crisis, acute hypoxic respiratory failure, and possible CAP.     Hospital course (by problem):     #Sepsis.   #CAP (community acquired pneumonia).   Fever to 100.4, , tachypneic at times, hypoxic to low 90s  Source likely CAP vs SIRS response to pain crisis   - CTX 1g daily for 5 days (3/15 - 3/19)    #Sickle cell pain crisis.   Presenting with 4 days of back, thorax, and chest pain and fever. Consistent with patient's previous pain crises, but more severe than prior episodes   - C/w home folic acid, vitamin B12, Vitamin D     #Acute hypoxic respiratory failure.   - CT scan showing bilateral lower lobe consolidations vs atelectasis   - C/w oxygen by nasal cannula and wean as tolerated  - Incentive spirometry.    #Sickle cell anemia.   Hb 11.5, unknown baseline; No sign of hemolysis   Never had transfusion in the past  - C/w home folic acid 1g    Patient was discharged to: home     New medications: Cefpodoxime  Changes to old medications: NA  Medications that were stopped: NA    Items to follow up as outpatient:  1. Follow up with hematologist   2. Continue to take cefpodoxime on discharge on 3/19    Physical exam at the time of discharge:    General: in no acute distress, resting comfortably on 2L NC  HENT: icteric sclera   Lungs: CTA B/L. No wheezes, rales, or rhonchi  Cardiovascular: RRR. No murmurs, rubs, or gallops  Abdomen: Soft, non-tender non-distended; No rebound or guarding  Extremities: WWP, No clubbing, cyanosis or edema  Neurological: Alert and oriented x3  Skin: Warm and dry. No obvious rash   Psych: normal affect       #Discharge: do not delete    33 year old male with PMHx of sickle cell disease presenting with three days of back, thorax, and chest pain, admitted for management of acute pain crisis, acute hypoxic respiratory failure, and possible CAP.     Hospital course (by problem):     #Severe Sepsis.   #CAP (community acquired pneumonia).   Fever to 100.4, , tachypneic at times, hypoxic to low 90s  Source likely CAP vs SIRS response to pain crisis   - CTX 1g daily for 5 days (3/15 - 3/19)    #Sickle cell pain crisis.   Presenting with 4 days of back, thorax, and chest pain and fever. Consistent with patient's previous pain crises, but more severe than prior episodes   - C/w home folic acid, vitamin B12, Vitamin D     #Acute hypoxic respiratory failure.   - CT scan showing bilateral lower lobe consolidations vs atelectasis   - C/w oxygen by nasal cannula and wean as tolerated  - Incentive spirometry.    #Sickle cell anemia.   Hb 11.5, unknown baseline; No sign of hemolysis   Never had transfusion in the past  - C/w home folic acid 1g    Patient was discharged to: home     New medications: Cefpodoxime  Changes to old medications: NA  Medications that were stopped: NA    Items to follow up as outpatient:  1. Follow up with hematologist   2. Continue to take cefpodoxime on discharge on 3/19    Physical exam at the time of discharge:    General: in no acute distress, resting comfortably on 2L NC  HENT: icteric sclera   Lungs: CTA B/L. No wheezes, rales, or rhonchi  Cardiovascular: RRR. No murmurs, rubs, or gallops  Abdomen: Soft, non-tender non-distended; No rebound or guarding  Extremities: WWP, No clubbing, cyanosis or edema  Neurological: Alert and oriented x3  Skin: Warm and dry. No obvious rash   Psych: normal affect       #Discharge: do not delete    33 year old male with PMHx of sickle cell disease presenting with three days of back, thorax, and chest pain, admitted for management of acute pain crisis, acute hypoxic respiratory failure, and possible CAP.     Hospital course (by problem):     #Severe Sepsis.   #Gram negative pneumonia.   Fever to 100.4, , tachypneic at times, hypoxic to low 90s  Source likely CAP vs SIRS response to pain crisis   - CTX 1g daily for 5 days (3/15 - 3/19)    #Sickle cell pain crisis.   Presenting with 4 days of back, thorax, and chest pain and fever. Consistent with patient's previous pain crises, but more severe than prior episodes   - C/w home folic acid, vitamin B12, Vitamin D     #Acute hypoxic respiratory failure.   - CT scan showing bilateral lower lobe consolidations vs atelectasis   - C/w oxygen by nasal cannula and wean as tolerated  - Incentive spirometry.    #Sickle cell anemia.   Hb 11.5, unknown baseline; No sign of hemolysis   Never had transfusion in the past  - C/w home folic acid 1g    #Cholelithiases.   #Elevated bilirubin  Bilirubin on admission 8.4, downtrending on 3/14  - Abdominal ultrasound showing cholelithiases      Patient was discharged to: home     New medications: Cefpodoxime  Changes to old medications: NA  Medications that were stopped: NA    Items to follow up as outpatient:  1. Follow up with hematologist   2. Continue to take cefpodoxime on discharge on 3/19    Physical exam at the time of discharge:    General: in no acute distress, resting comfortably on 2L NC  HENT: icteric sclera   Lungs: CTA B/L. No wheezes, rales, or rhonchi  Cardiovascular: RRR. No murmurs, rubs, or gallops  Abdomen: Soft, non-tender non-distended; No rebound or guarding  Extremities: WWP, No clubbing, cyanosis or edema  Neurological: Alert and oriented x3  Skin: Warm and dry. No obvious rash   Psych: normal affect

## 2024-03-15 NOTE — PROGRESS NOTE ADULT - ASSESSMENT
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 Hematology consulted for management recommendations.    #HbSc disease admitted for pain  This patient has had up to 4 pain crises requiring admission for help with pain control over the last 10 years, with regular follow up with Hematologist Dr. Wilmar Calles. He has never required any exchange transfusions, or medications. His presentation today is similar to prior pain crises in the setting of an underlying infectious trigger. Increasing O2 requirements concerning for acute chest syndrome. Hydoxyurea only has utility in HbSS or HbSbeta thalassemia, not HbSC.  Peripheral smear ordered, showing multiple codocytes, no schistocytes.  -Continue with IV fluids  -Appreciate palliative recs for pain control  -Infectious workup as per primary team, would consider CT abdomen/pelvis if patient's flank pain persists despite pain regimen as abdominal ultrasound revealed cholelithiasis.   -hemoglobin electrophoresis, b12, folate (can be obtained with am labs)  -daily reticulocyte count with am labs  -continue supplemental O2 as needed    discussed with Dr. 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 Hematology consulted for management recommendations.    #HbSc disease admitted for pain  This patient has had up to 4 pain crises requiring admission for help with pain control over the last 10 years, with regular follow up with Hematologist Dr. Wilmar Calles. He has never required any exchange transfusions, or medications. His presentation today is similar to prior pain crises in the setting of an underlying infectious trigger. Increasing O2 requirements concerning for acute chest syndrome. Hydoxyurea only has utility in HbSS or HbSbeta thalassemia, not HbSC. Patient also still complaining of flank pain, and has noted fevers. While a PE has been ruled out, his continued intermittent flank pain is concerning for intraabdominal pathology. His hemoglobin is is stable, elevated WBC concerning for an infectious/inflammatory process.    Peripheral smear ordered, showing multiple codocytes, no schistocytes.  -Continue with IV fluids  -Appreciate palliative recs for pain control  -Infectious workup as per primary team, would consider CT abdomen/pelvis if patient's flank pain persists despite pain regimen as abdominal ultrasound revealed cholelithiasis.   -hemoglobin electrophoresis, b12, folate (can be obtained with am labs)  -daily reticulocyte count with am labs  -continue supplemental O2 as needed    discussed with Dr. 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 Hematology consulted for management recommendations.    #HbSc disease admitted for pain  This patient has had up to 4 pain crises requiring admission for help with pain control over the last 10 years, with regular follow up with Hematologist Dr. Wilmar Calles. He has never required any exchange transfusions, or medications. His presentation today is similar to prior pain crises in the setting of an underlying infectious trigger. Increasing O2 requirements concerning for acute chest syndrome. Hydoxyurea only has utility in HbSS or HbSbeta thalassemia, and is not well studied in HbSC; and its onset of action is slow. Patient also still complaining of flank pain, and has noted fevers. While a PE has been ruled out, his continued intermittent flank pain is concerning for intraabdominal pathology. His hemoglobin is is stable, elevated WBC concerning for an ongoing infectious/inflammatory process.    Peripheral smear ordered, showing multiple codocytes, no schistocytes.  -Continue with IV fluids  -Appreciate palliative recs for pain control  -Infectious workup as per primary team, would consider CT abdomen/pelvis if patient's flank pain persists despite pain regimen as abdominal ultrasound revealed cholelithiasis.   -hemoglobin electrophoresis, b12, folate (can be obtained with am labs)  -daily reticulocyte count with am labs  -continue supplemental O2 as needed    discussed with Dr. Piña

## 2024-03-15 NOTE — DISCHARGE NOTE PROVIDER - NSDCCPCAREPLAN_GEN_ALL_CORE_FT
PRINCIPAL DISCHARGE DIAGNOSIS  Diagnosis: Sickle cell pain crisis  Assessment and Plan of Treatment: You were admitted for sickle cell pain crisis. We treated you with pain medicine and antibiotics in the setting of possible infection that triggered this episode. We recommend follow up with your primary doctor, as well as your hematologist. As discussed, you can follow up with your physician in [place], or the hematologist whose information is provided below.     PRINCIPAL DISCHARGE DIAGNOSIS  Diagnosis: Sickle cell pain crisis  Assessment and Plan of Treatment: You were admitted for sickle cell pain crisis. We treated you with pain medicine and antibiotics in the setting of possible infection that triggered this episode. We recommend follow up with your primary doctor, as well as your hematologist. As discussed, you have follow up with your hematologist on Friday 3/22.     PRINCIPAL DISCHARGE DIAGNOSIS  Diagnosis: Sickle cell pain crisis  Assessment and Plan of Treatment: You were admitted for sickle cell pain crisis. We treated you with pain medicine and antibiotics in the setting of possible infection that triggered this episode. We recommend follow up with your primary doctor, as well as your hematologist. As discussed, you have follow up with your hematologist on Friday 3/22.      SECONDARY DISCHARGE DIAGNOSES  Diagnosis: CAP (community acquired pneumonia)  Assessment and Plan of Treatment: You were diagnosed with pneumonia, or an infection of the lungs during your admission to [facility]. This was noted based on your symptom profile and findings on CT/chest X-ray. You were treated with antibiotics throughout your hospital course. Please continue to take cefpodoxime until 3/19. Please follow-up with your primary care physician when you leave the hospital. Should you experience symptoms such as but not limited to: worsening shortness of breath, wheezing, severe cough, coughing bloody sputum, unrelenting/high fever (>103 F or lasting >3 days), and chest pain, please return to the emergency department for interval evaluation.     PRINCIPAL DISCHARGE DIAGNOSIS  Diagnosis: Severe sepsis  Assessment and Plan of Treatment:       SECONDARY DISCHARGE DIAGNOSES  Diagnosis: Sickle cell pain crisis  Assessment and Plan of Treatment: You were admitted for sickle cell pain crisis. We treated you with pain medicine and antibiotics in the setting of possible infection that triggered this episode. We recommend follow up with your primary doctor, as well as your hematologist. As discussed, you have follow up with your hematologist on Friday 3/22.    Diagnosis: Gram-negative pneumonia  Assessment and Plan of Treatment: You were diagnosed with pneumonia, or an infection of the lungs during your admission to [facility]. This was noted based on your symptom profile and findings on CT/chest X-ray. You were treated with antibiotics throughout your hospital course. Please continue to take cefpodoxime until 3/19. Please follow-up with your primary care physician when you leave the hospital. Should you experience symptoms such as but not limited to: worsening shortness of breath, wheezing, severe cough, coughing bloody sputum, unrelenting/high fever (>103 F or lasting >3 days), and chest pain, please return to the emergency department for interval evaluation.    Diagnosis: Acute hypoxic respiratory failure  Assessment and Plan of Treatment:

## 2024-03-15 NOTE — CONSULT NOTE ADULT - ATTENDING COMMENTS
HEMATOLOGY ATTENDING NOTE    In brief, patient is a 34yo man with h/o HbSc disease, for which he regularly follows with a hematologist, Dr Wilmar Calles, admitted at [facility] in pain crisis for which hematology is consulted for. Patient with occasional pain crisis, not on hydrea; over past ~10yrs, 4 admissions for pain crisis with infection usually as the inciting event. Patient presenting with lomeli with desaturation requiring supplemental O2 at 2L, also ongoing, flank pain and fevers, mild anemia with elevated retic count w/o concern for aplastic crisis; appearing comfortable in bed at rest on supplemental O2 not using accessory muscles of respiration.    Plan  Would evaluate for source of infection, with CT abdomen in addition to CT chest.   Continue to IVF, pain regimen, bowel regimen given opioid medications, and antibiotics as per primary team.   Check hemoglobin electrophoresis, check micronutrient levels (folate, VB12, methylmalonic acid), and repeat reticulocyte count.  Monitor closely patient's respiratory status and if worsening please contact hematology, ICU right away. In case of acute chest (for which a lung infection needs confirmation/presence of infiltrate/consolidation on chest imaging is needed), plasma exchange may be needed; hydrea is implemented in long term management of frequent pain crises or ACS as it has a slower onset of action.     Hematology to continue to follow closely.
33 M with sickle cell disease (HbSC), previous pain crisis in 2017, never had exchange transfusion presents with back, thorax, and chest pain for three days and progressive dyspnea. CTA chest showed BL infiltrates and atelectasis and was negative for PE. Physical exam as above. The pain seems to be well controlled in the PCA. He is able to do at least 1500 ml on the incentive spirometer without pain.   1. Sickle cell crisis  2. PNA  3. Acute respiratory failure with hypoxiema  - cont supplemental oxygen  - pain management   - cont ceftriaxone  - iv fluids

## 2024-03-15 NOTE — PROGRESS NOTE ADULT - PROBLEM SELECTOR PLAN 4
Possible CAP given CT scan findings of bilateral lower lobe consolidations  Leukocytosis to 22   Fever     Plan:  -CTX 1g daily for 5 days and azithromycin 500mg daily for 3 days  -f/u sputum culture, MRSA swab, urine strep/legionella Possible CAP given CT scan findings of bilateral lower lobe consolidations  - MRSA, urine strep and legionella negative    Plan:  -CTX 1g daily for 5 days; d/c azithromycin 500mg daily for 3 days  -f/u sputum culture Possible CAP given CT scan findings of bilateral lower lobe consolidations  - MRSA, urine strep and legionella negative    Plan:  - CTX 1g daily for 5 days; d/c azithromycin 500mg daily for 3 days  - f/u sputum culture

## 2024-03-15 NOTE — PROGRESS NOTE ADULT - PROBLEM SELECTOR PLAN 5
Hb 11.5, unknown baseline   MCV 78  Ferritin 732, Tsat 17%  No sign of hemolysis   Never had transfusion in the past    Plan:  -c/w home folic acid 1g  -f/u heme/onc recs  -Maintain active T&S Hb 11.5, unknown baseline   MCV 78  Ferritin 732, Tsat 17%  No sign of hemolysis   Never had transfusion in the past    Plan:  -c/w home folic acid 1g  -Maintain active T&S - Hb 11.5, unknown baseline; No sign of hemolysis   - Never had transfusion in the past    Plan:  - c/w home folic acid 1g  - Maintain active T&S

## 2024-03-15 NOTE — DISCHARGE NOTE PROVIDER - NSDCMRMEDTOKEN_GEN_ALL_CORE_FT
folic acid 1 mg oral tablet: 1 tab(s) orally once a day  Vitamin B12 1000 mcg oral tablet: 1 tab(s) orally once a day  Vitamin D3 125 mcg (5000 intl units) oral capsule: 1 cap(s) orally once a day   cefpodoxime 200 mg oral tablet: 1 tab(s) orally 2 times a day Please take one pill every 12 hours on 3/19 to complete course  folic acid 1 mg oral tablet: 1 tab(s) orally once a day  Vitamin B12 1000 mcg oral tablet: 1 tab(s) orally once a day  Vitamin D3 125 mcg (5000 intl units) oral capsule: 1 cap(s) orally once a day

## 2024-03-15 NOTE — DISCHARGE NOTE PROVIDER - EXTENDED VTE YES NO FOR MLM ENOXAPARIN
How Severe Is Your Acne?: mild
Is This A New Presentation, Or A Follow-Up?: Follow Up Acne
Additional Comments (Use Complete Sentences): PT NEEDS REFILLS OF ALL HER MEDS.
,

## 2024-03-15 NOTE — PROGRESS NOTE ADULT - SUBJECTIVE AND OBJECTIVE BOX
Internal Medicine Progress Note  Erica Landeros, PGY-1  Pager: [phone]    ******INCOMPLETE******    Patient is a 33y old  Male who presents with a chief complaint of   OVERNIGHT EVENTS/INTERVAL HPI:    REVIEW OF SYSTEMS:  All other review of systems is negative unless indicated above.    OBJECTIVE:  T(C): 37.8 (03-15-24 @ 06:11), Max: 38.2 (03-14-24 @ 21:30)  HR: 104 (03-15-24 @ 06:11) (92 - 105)  BP: 133/73 (03-15-24 @ 06:11) (125/71 - 154/83)  RR: 18 (03-15-24 @ 06:11) (16 - 20)  SpO2: 92% (03-15-24 @ 06:11) (92% - 96%)  Daily Height in cm: 185.42 (14 Mar 2024 08:46)    Daily     Physical Exam:  General: in no acute distress  Eyes: EOMI intact bilaterally. Anicteric sclerae, moist conjunctivae  HENT: Moist mucous membranes  Neck: Trachea midline, supple  Lungs: CTA B/L. No wheezes, rales, or rhonchi  Cardiovascular: RRR. No murmurs, rubs, or gallops  Abdomen: Soft, non-tender non-distended; No rebound or guarding  Extremities: WWP, No clubbing, cyanosis or edema  MSK: No midline bony tenderness. No CVA tenderness bilaterally  Neurological: Alert and oriented x3  Skin: Warm and dry. No obvious rash     Medications:  MEDICATIONS  (STANDING):  azithromycin  IVPB 500 milliGRAM(s) IV Intermittent every 24 hours  cefTRIAXone   IVPB 2000 milliGRAM(s) IV Intermittent every 24 hours  dextrose 5% + sodium chloride 0.3%. 1000 milliLiter(s) (80 mL/Hr) IV Continuous <Continuous>  enoxaparin Injectable 40 milliGRAM(s) SubCutaneous every 24 hours  folic acid 1 milliGRAM(s) Oral daily  HYDROmorphone PCA (1 mG/mL) 30 milliLiter(s) PCA Continuous PCA Continuous  influenza   Vaccine 0.5 milliLiter(s) IntraMuscular once  polyethylene glycol 3350 17 Gram(s) Oral daily  senna 2 Tablet(s) Oral at bedtime    MEDICATIONS  (PRN):  acetaminophen     Tablet .. 650 milliGRAM(s) Oral every 6 hours PRN Temp greater or equal to 38C (100.4F), Mild Pain (1 - 3)  naloxone Injectable 0.1 milliGRAM(s) IV Push every 3 minutes PRN For ANY of the following changes in patient status:  A. RR LESS THAN 10 breaths per minute, B. Oxygen saturation LESS THAN 90%, C. Sedation score of 6  ondansetron Injectable 4 milliGRAM(s) IV Push every 6 hours PRN Nausea      Labs:                        11.5   23.32 )-----------( 427      ( 14 Mar 2024 12:00 )             31.6     03-14    134<L>  |  100  |  11  ----------------------------<  113<H>  3.8   |  26  |  0.96    Ca    8.9      14 Mar 2024 12:00  Phos  2.1     03-14  Mg     1.9     03-14    TPro  7.3  /  Alb  3.7  /  TBili  7.3<H>  /  DBili  x   /  AST  60<H>  /  ALT  74<H>  /  AlkPhos  80  03-14    PT/INR - ( 13 Mar 2024 17:46 )   PT: 13.4 sec;   INR: 1.19          PTT - ( 13 Mar 2024 17:46 )  PTT:27.8 sec  Urinalysis Basic - ( 14 Mar 2024 12:00 )    Color: x / Appearance: x / SG: x / pH: x  Gluc: 113 mg/dL / Ketone: x  / Bili: x / Urobili: x   Blood: x / Protein: x / Nitrite: x   Leuk Esterase: x / RBC: x / WBC x   Sq Epi: x / Non Sq Epi: x / Bacteria: x      SARS-CoV-2: NotDetec (14 Mar 2024 18:35)      Radiology: Reviewed Patient is a 33y old  Male who presents with a chief complaint of     OVERNIGHT EVENTS/INTERVAL HPI: Patient was seen and examined this AM. States that he is feeling well. Denies any URI symptoms of cough, runny nose, difficulty breathing or wheezing, urinary symptoms, or diarrhea. States that the pain is well controlled while he is sitting still in bed and worsens when he moves around.     REVIEW OF SYSTEMS:  All other review of systems is negative unless indicated above.    OBJECTIVE:  T(C): 37.8 (03-15-24 @ 06:11), Max: 38.2 (03-14-24 @ 21:30)  HR: 104 (03-15-24 @ 06:11) (92 - 105)  BP: 133/73 (03-15-24 @ 06:11) (125/71 - 154/83)  RR: 18 (03-15-24 @ 06:11) (16 - 20)  SpO2: 92% (03-15-24 @ 06:11) (92% - 96%)  Daily Height in cm: 185.42 (14 Mar 2024 08:46)    Daily     Physical Exam:  General: in no acute distress, resting comfortably on 2L NC  Lungs: CTA B/L. No wheezes, rales, or rhonchi  Cardiovascular: RRR. No murmurs, rubs, or gallops  Abdomen: Soft, non-tender non-distended; No rebound or guarding  Extremities: WWP, No clubbing, cyanosis or edema  Neurological: Alert and oriented x3  Skin: Warm and dry. No obvious rash     Medications:  MEDICATIONS  (STANDING):  azithromycin  IVPB 500 milliGRAM(s) IV Intermittent every 24 hours  cefTRIAXone   IVPB 2000 milliGRAM(s) IV Intermittent every 24 hours  dextrose 5% + sodium chloride 0.3%. 1000 milliLiter(s) (80 mL/Hr) IV Continuous <Continuous>  enoxaparin Injectable 40 milliGRAM(s) SubCutaneous every 24 hours  folic acid 1 milliGRAM(s) Oral daily  HYDROmorphone PCA (1 mG/mL) 30 milliLiter(s) PCA Continuous PCA Continuous  influenza   Vaccine 0.5 milliLiter(s) IntraMuscular once  polyethylene glycol 3350 17 Gram(s) Oral daily  senna 2 Tablet(s) Oral at bedtime    MEDICATIONS  (PRN):  acetaminophen     Tablet .. 650 milliGRAM(s) Oral every 6 hours PRN Temp greater or equal to 38C (100.4F), Mild Pain (1 - 3)  naloxone Injectable 0.1 milliGRAM(s) IV Push every 3 minutes PRN For ANY of the following changes in patient status:  A. RR LESS THAN 10 breaths per minute, B. Oxygen saturation LESS THAN 90%, C. Sedation score of 6  ondansetron Injectable 4 milliGRAM(s) IV Push every 6 hours PRN Nausea      Labs:                        11.5   23.32 )-----------( 427      ( 14 Mar 2024 12:00 )             31.6     03-14    134<L>  |  100  |  11  ----------------------------<  113<H>  3.8   |  26  |  0.96    Ca    8.9      14 Mar 2024 12:00  Phos  2.1     03-14  Mg     1.9     03-14    TPro  7.3  /  Alb  3.7  /  TBili  7.3<H>  /  DBili  x   /  AST  60<H>  /  ALT  74<H>  /  AlkPhos  80  03-14    PT/INR - ( 13 Mar 2024 17:46 )   PT: 13.4 sec;   INR: 1.19          PTT - ( 13 Mar 2024 17:46 )  PTT:27.8 sec  Urinalysis Basic - ( 14 Mar 2024 12:00 )    Color: x / Appearance: x / SG: x / pH: x  Gluc: 113 mg/dL / Ketone: x  / Bili: x / Urobili: x   Blood: x / Protein: x / Nitrite: x   Leuk Esterase: x / RBC: x / WBC x   Sq Epi: x / Non Sq Epi: x / Bacteria: x      SARS-CoV-2: NotDetec (14 Mar 2024 18:35)      Radiology: Reviewed Hospital Course: 33 year old male with PMHx of sickle cell disease presenting with three days of back, thorax, and chest pain, admitted for management of acute pain crisis, acute hypoxic respiratory failure, and possible CAP. CXR significant for left pleural effusion and atelectasis. CT PE negative. Started on IVF, hydromorphone PCA pump (titrated as needed), and CTX/azithromycin for CAP coverage. RVP, urine strep/legionella negative. Initially on 2-4L NC with goal sp02 of 95%. Encouraged incentive spirometry and ambulation. Due to repeat CXR showing worsening atelectasis vs pneumonia iso uptitration of PCA pump and increasing 02 requirements ICU was consulted for transfer to [facility] for closer monitoring.       OVERNIGHT EVENTS/INTERVAL HPI: Patient was seen and examined this AM. States that he is feeling well. Denies any URI symptoms of cough, runny nose, difficulty breathing or wheezing, urinary symptoms, or diarrhea. States that the pain is well controlled while he is sitting still in bed and worsens when he moves around.     REVIEW OF SYSTEMS:  All other review of systems is negative unless indicated above.    OBJECTIVE:  T(C): 37.8 (03-15-24 @ 06:11), Max: 38.2 (03-14-24 @ 21:30)  HR: 104 (03-15-24 @ 06:11) (92 - 105)  BP: 133/73 (03-15-24 @ 06:11) (125/71 - 154/83)  RR: 18 (03-15-24 @ 06:11) (16 - 20)  SpO2: 92% (03-15-24 @ 06:11) (92% - 96%)  Daily Height in cm: 185.42 (14 Mar 2024 08:46)    Daily     Physical Exam:  General: in no acute distress, resting comfortably on 2L NC  Lungs: CTA B/L. No wheezes, rales, or rhonchi  Cardiovascular: RRR. No murmurs, rubs, or gallops  Abdomen: Soft, non-tender non-distended; No rebound or guarding  Extremities: WWP, No clubbing, cyanosis or edema  Neurological: Alert and oriented x3  Skin: Warm and dry. No obvious rash     Medications:  MEDICATIONS  (STANDING):  azithromycin  IVPB 500 milliGRAM(s) IV Intermittent every 24 hours  cefTRIAXone   IVPB 2000 milliGRAM(s) IV Intermittent every 24 hours  dextrose 5% + sodium chloride 0.3%. 1000 milliLiter(s) (80 mL/Hr) IV Continuous <Continuous>  enoxaparin Injectable 40 milliGRAM(s) SubCutaneous every 24 hours  folic acid 1 milliGRAM(s) Oral daily  HYDROmorphone PCA (1 mG/mL) 30 milliLiter(s) PCA Continuous PCA Continuous  influenza   Vaccine 0.5 milliLiter(s) IntraMuscular once  polyethylene glycol 3350 17 Gram(s) Oral daily  senna 2 Tablet(s) Oral at bedtime    MEDICATIONS  (PRN):  acetaminophen     Tablet .. 650 milliGRAM(s) Oral every 6 hours PRN Temp greater or equal to 38C (100.4F), Mild Pain (1 - 3)  naloxone Injectable 0.1 milliGRAM(s) IV Push every 3 minutes PRN For ANY of the following changes in patient status:  A. RR LESS THAN 10 breaths per minute, B. Oxygen saturation LESS THAN 90%, C. Sedation score of 6  ondansetron Injectable 4 milliGRAM(s) IV Push every 6 hours PRN Nausea      Labs:                        11.5   23.32 )-----------( 427      ( 14 Mar 2024 12:00 )             31.6     03-14    134<L>  |  100  |  11  ----------------------------<  113<H>  3.8   |  26  |  0.96    Ca    8.9      14 Mar 2024 12:00  Phos  2.1     03-14  Mg     1.9     03-14    TPro  7.3  /  Alb  3.7  /  TBili  7.3<H>  /  DBili  x   /  AST  60<H>  /  ALT  74<H>  /  AlkPhos  80  03-14    PT/INR - ( 13 Mar 2024 17:46 )   PT: 13.4 sec;   INR: 1.19          PTT - ( 13 Mar 2024 17:46 )  PTT:27.8 sec  Urinalysis Basic - ( 14 Mar 2024 12:00 )    Color: x / Appearance: x / SG: x / pH: x  Gluc: 113 mg/dL / Ketone: x  / Bili: x / Urobili: x   Blood: x / Protein: x / Nitrite: x   Leuk Esterase: x / RBC: x / WBC x   Sq Epi: x / Non Sq Epi: x / Bacteria: x      SARS-CoV-2: NotDetec (14 Mar 2024 18:35)      Radiology: Reviewed Hospital Course: 33 year old male with PMHx of sickle cell disease presenting with three days of back, thorax, and chest pain, admitted for management of acute pain crisis, acute hypoxic respiratory failure, and possible CAP. Has a hx of mini-crises that have been managed at home with percocet, last hospitalization in 2021 with a crisis. Denies history of transfusions, intubation or acute chest syndrome. On admission, patient was septic with T 100.4  and WBC 23.3. Hgb 11.5, without hemolysis. CXR significant for left pleural effusion and atelectasis. CT PE negative. Started on IVF, hydromorphone PCA pump (titrated as needed), and CTX/azithromycin for CAP coverage (azithromycin later discontinued). RVP, urine strep/legionella negative. Initially on 2-4L NC with goal sp02 of 95%. Encouraged incentive spirometry and ambulation. Due to repeat CXR showing worsening atelectasis vs pneumonia iso uptitration of PCA pump and increasing 02 requirements ICU was consulted for transfer to [facility] for closer monitoring.       OVERNIGHT EVENTS/INTERVAL HPI: Patient was seen and examined this AM. States that he is feeling well. Denies any URI symptoms of cough, runny nose, difficulty breathing or wheezing, urinary symptoms, or diarrhea. States that the pain is well controlled while he is sitting still in bed and worsens when he moves around.     REVIEW OF SYSTEMS:  All other review of systems is negative unless indicated above.    OBJECTIVE:  T(C): 37.8 (03-15-24 @ 06:11), Max: 38.2 (03-14-24 @ 21:30)  HR: 104 (03-15-24 @ 06:11) (92 - 105)  BP: 133/73 (03-15-24 @ 06:11) (125/71 - 154/83)  RR: 18 (03-15-24 @ 06:11) (16 - 20)  SpO2: 92% (03-15-24 @ 06:11) (92% - 96%)  Daily Height in cm: 185.42 (14 Mar 2024 08:46)    Daily     Physical Exam:  General: in no acute distress, resting comfortably on 2L NC  HENT: icteric sclera   Lungs: CTA B/L. No wheezes, rales, or rhonchi  Cardiovascular: RRR. No murmurs, rubs, or gallops  Abdomen: Soft, non-tender non-distended; No rebound or guarding  Extremities: WWP, No clubbing, cyanosis or edema  Neurological: Alert and oriented x3  Skin: Warm and dry. No obvious rash     Medications:  MEDICATIONS  (STANDING):  azithromycin  IVPB 500 milliGRAM(s) IV Intermittent every 24 hours  cefTRIAXone   IVPB 2000 milliGRAM(s) IV Intermittent every 24 hours  dextrose 5% + sodium chloride 0.3%. 1000 milliLiter(s) (80 mL/Hr) IV Continuous <Continuous>  enoxaparin Injectable 40 milliGRAM(s) SubCutaneous every 24 hours  folic acid 1 milliGRAM(s) Oral daily  HYDROmorphone PCA (1 mG/mL) 30 milliLiter(s) PCA Continuous PCA Continuous  influenza   Vaccine 0.5 milliLiter(s) IntraMuscular once  polyethylene glycol 3350 17 Gram(s) Oral daily  senna 2 Tablet(s) Oral at bedtime    MEDICATIONS  (PRN):  acetaminophen     Tablet .. 650 milliGRAM(s) Oral every 6 hours PRN Temp greater or equal to 38C (100.4F), Mild Pain (1 - 3)  naloxone Injectable 0.1 milliGRAM(s) IV Push every 3 minutes PRN For ANY of the following changes in patient status:  A. RR LESS THAN 10 breaths per minute, B. Oxygen saturation LESS THAN 90%, C. Sedation score of 6  ondansetron Injectable 4 milliGRAM(s) IV Push every 6 hours PRN Nausea      Labs:                        11.5   23.32 )-----------( 427      ( 14 Mar 2024 12:00 )             31.6     03-14    134<L>  |  100  |  11  ----------------------------<  113<H>  3.8   |  26  |  0.96    Ca    8.9      14 Mar 2024 12:00  Phos  2.1     03-14  Mg     1.9     03-14    TPro  7.3  /  Alb  3.7  /  TBili  7.3<H>  /  DBili  x   /  AST  60<H>  /  ALT  74<H>  /  AlkPhos  80  03-14    PT/INR - ( 13 Mar 2024 17:46 )   PT: 13.4 sec;   INR: 1.19          PTT - ( 13 Mar 2024 17:46 )  PTT:27.8 sec  Urinalysis Basic - ( 14 Mar 2024 12:00 )    Color: x / Appearance: x / SG: x / pH: x  Gluc: 113 mg/dL / Ketone: x  / Bili: x / Urobili: x   Blood: x / Protein: x / Nitrite: x   Leuk Esterase: x / RBC: x / WBC x   Sq Epi: x / Non Sq Epi: x / Bacteria: x      SARS-CoV-2: NotDetec (14 Mar 2024 18:35)      Radiology: Reviewed

## 2024-03-15 NOTE — CONSULT NOTE ADULT - SUBJECTIVE AND OBJECTIVE BOX
Patient is a 33y old  Male who presents with a chief complaint of Pain (15 Mar 2024 06:34)    Consult reason: [facility]  Vitals: T99.7F      RR20  /75  SpO2 95% on 6LNC  Labs signficant: WBC 28.5, Hgb 11.4, MCV 79.5, Na 134, Bilirubin 7.2, AST/ALT 44/64, procal 0.66, RVP negative, strep/legionella negative  CXR: Since 3/13/2024, there has been interval increase in bilateral lower lung zone opacities, consistent with worsening atelectasis/pneumonia. Small bilateral pleural effusions.  CT chest: Bibasilar focal atelectasis and/or pneumonia  Interventions: CTX 2g qd, Dilaudid PCA pump, 1/2NS at 125mL/hr    HPI:  33 year old male with PMHx of sickle cell disease presenting with three days of back, thorax, and chest pain, Patient had URI symptoms about two weeks, took two covid tests which were negative, and his symptoms resolved. Then about three days ago, he started to feel right flank pain and took percocet and ibuprofen, but it continued and the next day he also felt intermittent shortness of breath with exertion. He visited the ED yesterday and was given pain medications which improved his symptoms some, so he returned home, but then felt the pain spreading to left chest, upper back, and abdomen and felt severe shortness of breath, so returned to the ED today. He has had mini-crises that he manages at home a few times per year with percocet and was last hospitalized in 2021 with a crisis. He has never felt pain this severe in his life. Has never had a transfusion or intubation or acute chest syndrome. He has a hematologist who he sees regularly.     Pt's pain more controlled with PCA pump. Continues to have low-grade fevers. ICU consulted for increasing oxygen requirements, now 95% on 6LNC. Pt reports shortness of breath with movement/exertion and without oxygen via NC. Denies shortness of breath at rest. Denies HA, anterior chest pain, abdominal pain. Admits to pain on bilateral lower lateral chest wall. Denies cough, sputum production.     ED COURSE:   Vitals: Tmax 100.4, HR 93 -> 103, /80 - 151/80, RR18, SpO2 92% on room air --> 96% on 2 L NC  Significant Labs: WBC 23.3, Hb 12.3, , Bili 2.6, AST 70, ALT 60  EKG: Not done  Imaging:   -US abd limited: Contracted stone filled gallbladder  Interventions: Hydromorphone 1mg IV push x1, NS 1 L x1   (14 Mar 2024 10:10)      Allergies    No Known Allergies    Intolerances      Home Medications:  folic acid 1 mg oral tablet: 1 tab(s) orally once a day (14 Mar 2024 10:20)  Vitamin B12 1000 mcg oral tablet: 1 tab(s) orally once a day (14 Mar 2024 10:20)  Vitamin D3 125 mcg (5000 intl units) oral capsule: 1 cap(s) orally once a day (14 Mar 2024 10:20)      SOCIAL HX:     Smoking          ETOH/Illicit drugs          Occupation    PAST MEDICAL & SURGICAL HISTORY:  Sickle cell anemia      No significant past surgical history          FAMILY HISTORY:  Family history of sickle cell trait (Father)    Family history of thalassemia (Mother)    :    No known cardiovascular or pulmonary family history     ROS:  See HPI     PHYSICAL EXAM    ICU Vital Signs Last 24 Hrs  T(C): 37.7 (15 Mar 2024 18:28), Max: 38.2 (14 Mar 2024 21:30)  T(F): 99.9 (15 Mar 2024 18:28), Max: 100.8 (14 Mar 2024 21:30)  HR: 97 (15 Mar 2024 18:28) (97 - 106)  BP: 129/73 (15 Mar 2024 18:28) (124/75 - 133/73)  BP(mean): --  ABP: --  ABP(mean): --  RR: 19 (15 Mar 2024 18:28) (18 - 20)  SpO2: 95% (15 Mar 2024 18:28) (88% - 95%)    O2 Parameters below as of 15 Mar 2024 18:28  Patient On (Oxygen Delivery Method): nasal cannula  O2 Flow (L/min): 5          General: Not in distress  HEENT:  SHELL              Lymphatic system: No LN  Lungs: Bilateral BS  Cardiovascular: Regular  Gastrointestinal: Soft, Positive BS  Musculoskeletal: No clubbing.  Moves all extremities.    Skin: Warm.  Intact  Neurological: No motor or sensory deficit         LABS:                          11.4   28.51 )-----------( 424      ( 15 Mar 2024 07:20 )             31.4                                               03-15    134<L>  |  98  |  10  ----------------------------<  99  3.6   |  26  |  1.01    Ca    9.3      15 Mar 2024 07:20  Phos  2.5     03-15  Mg     1.9     03-15    TPro  7.1  /  Alb  3.5  /  TBili  7.2<H>  /  DBili  4.0<H>  /  AST  44<H>  /  ALT  64<H>  /  AlkPhos  76  03-15                                             Urinalysis Basic - ( 15 Mar 2024 07:20 )    Color: x / Appearance: x / SG: x / pH: x  Gluc: 99 mg/dL / Ketone: x  / Bili: x / Urobili: x   Blood: x / Protein: x / Nitrite: x   Leuk Esterase: x / RBC: x / WBC x   Sq Epi: x / Non Sq Epi: x / Bacteria: x        CARDIAC MARKERS ( 14 Mar 2024 12:00 )  x     / x     / 125 U/L / x     / 1.7 ng/mL                                            LIVER FUNCTIONS - ( 15 Mar 2024 07:20 )  Alb: 3.5 g/dL / Pro: 7.1 g/dL / ALK PHOS: 76 U/L / ALT: 64 U/L / AST: 44 U/L / GGT: x                                                  Culture - Blood (collected 14 Mar 2024 06:14)  Source: .Blood Blood-Peripheral  Preliminary Report (15 Mar 2024 13:03):    No growth at 24 hours    Culture - Blood (collected 14 Mar 2024 06:14)  Source: .Blood Blood-Peripheral  Preliminary Report (15 Mar 2024 13:03):    No growth at 24 hours    Culture - Urine (collected 13 Mar 2024 17:46)  Source: Clean Catch Clean Catch (Midstream)  Final Report (14 Mar 2024 22:27):    No growth                                                                                               CXR:    ECHO:    MEDICATIONS  (STANDING):  cefTRIAXone   IVPB 2000 milliGRAM(s) IV Intermittent every 24 hours  enoxaparin Injectable 40 milliGRAM(s) SubCutaneous every 24 hours  folic acid 1 milliGRAM(s) Oral daily  HYDROmorphone PCA (1 mG/mL) 30 milliLiter(s) PCA Continuous PCA Continuous  influenza   Vaccine 0.5 milliLiter(s) IntraMuscular once  polyethylene glycol 3350 17 Gram(s) Oral every 12 hours  senna 2 Tablet(s) Oral at bedtime  sodium chloride 0.45%. 1000 milliLiter(s) (125 mL/Hr) IV Continuous <Continuous>    MEDICATIONS  (PRN):  acetaminophen     Tablet .. 650 milliGRAM(s) Oral every 6 hours PRN Temp greater or equal to 38C (100.4F), Mild Pain (1 - 3)  naloxone Injectable 0.1 milliGRAM(s) IV Push every 3 minutes PRN For ANY of the following changes in patient status:  A. RR LESS THAN 10 breaths per minute, B. Oxygen saturation LESS THAN 90%, C. Sedation score of 6  ondansetron Injectable 4 milliGRAM(s) IV Push every 6 hours PRN Nausea

## 2024-03-15 NOTE — DISCHARGE NOTE PROVIDER - ATTENDING DISCHARGE PHYSICAL EXAMINATION:
Patient was seen and examined at bedside on 3/17/2024 at 1 pm, spoke to patient on 3/18 AM. Patient feels dramatically improved. Feels that his breathing is improved. Had a BM. Denies N/V, abdominal pain. ROS is otherwise negative. Vitals, labwork and pertinent imaging reviewed. Exam - NAD, AAO x 4, PERRLA, EOMI, MMM, supple neck, chest - bibasilar crackles, CV - rrr, s1s2, no m/r/g, no edema, abd - soft, NTND, + BS, ext - wwp, psych - normal affect    Plan:  -C/w pain control - patient back on his home medications  -C/w abx to finish course, blood cultures - NGTD  -Folic acid  -OOB  -Recorded oxygen of 97% on RA

## 2024-03-15 NOTE — PROGRESS NOTE ADULT - PROBLEM SELECTOR PLAN 6
F: D5W and 0.3% NS at 80ml/hr  E: Replete PRN  N: Regular diet   DVT: Lovenox 40mg daily   Code: FULL F: 0.45%  cc/hr  E: Replete PRN  N: Regular diet   DVT: Lovenox 40mg daily   Code: FULL On admission, complaining of abdominal pain.   - Abdominal ultrasound showing cholelithiases    - Outpatient follow up #Elevated bilirubin  Bilirubin on admission 8.4, downtrending on 3/14  - Abdominal ultrasound showing cholelithiases    - Continue to trend

## 2024-03-15 NOTE — DISCHARGE NOTE PROVIDER - CARE PROVIDER_API CALL
Lydia Piña  Hematology/Oncology  [address]  [place] [address]  Phone: [phone]  Fax: [phone]  Follow Up Time: 2 weeks   Wilmar Calles  Phone: (   )    -  Fax: (   )    -  Established Patient  Scheduled Appointment: 03/22/2024

## 2024-03-15 NOTE — CONSULT NOTE ADULT - ASSESSMENT
33M with PMHx of sickle cell disease, alpha thalassemia p/w three days of back, thorax, and chest pain, 3 weeks s/p URI symptoms, found to have bibasilar PNA and sickle cell crisis.     #AHRF  #Sickle Cell Crisis  #PNA, Bibasilar  P/w pain, shortness of breath, now with increasing O2 requirements 2L -> 6LNC   Found to have sepsis 2/2 bibasilar PNA causing sickle cell crisis  AHRF most likely 2/2 PNA (with worsening pleural effusions) and atelectasis d/t splinting  Reports sickle cell crises generally provoked by illness and intense exercise   Currently nontoxic appearing, comfortable on 6LNC  - c/w antibiotics per primary team  - recommend incentive spirometry as atelectasis likely contributing to increasing oxygen requirements  - CXR not consistent with acute chest syndrome   - c/w IV hydration, pain control per primary team     *Patient has no telemetry needs at this time. Please reconsult as needed.  *Case discussed with Dr. Lazaro

## 2024-03-15 NOTE — PROGRESS NOTE ADULT - PROBLEM SELECTOR PLAN 3
Pt currently requiring 2-3 L NC, is tachypneic with exertion  CT scan showing bilateral lower lobe consolidations vs atelectasis   Chest pain responsive to dilaudid at this time and cardiac enzymes negative   CT PE negative   Most likely CAP vs pain crisis     Plan:  -c/w oxygen by nasal cannula and wean as tolerated  -Incentive spirometry - CT scan showing bilateral lower lobe consolidations vs atelectasis; PE negative   DDx: CAP vs pain crisis     Plan:  - c/w oxygen by nasal cannula and wean as tolerated, goal is 02 saturation of 95%  - Incentive spirometry

## 2024-03-16 DIAGNOSIS — A41.9 SEPSIS, UNSPECIFIED ORGANISM: ICD-10-CM

## 2024-03-16 DIAGNOSIS — J15.69 PNEUMONIA DUE TO OTHER GRAM-NEGATIVE BACTERIA: ICD-10-CM

## 2024-03-16 LAB
ALBUMIN SERPL ELPH-MCNC: 3.5 G/DL — SIGNIFICANT CHANGE UP (ref 3.3–5)
ALP SERPL-CCNC: 72 U/L — SIGNIFICANT CHANGE UP (ref 40–120)
ALT FLD-CCNC: 42 U/L — SIGNIFICANT CHANGE UP (ref 10–45)
ANION GAP SERPL CALC-SCNC: 9 MMOL/L — SIGNIFICANT CHANGE UP (ref 5–17)
ANISOCYTOSIS BLD QL: SLIGHT — SIGNIFICANT CHANGE UP
AST SERPL-CCNC: 28 U/L — SIGNIFICANT CHANGE UP (ref 10–40)
BASOPHILS # BLD AUTO: 0 K/UL — SIGNIFICANT CHANGE UP (ref 0–0.2)
BASOPHILS NFR BLD AUTO: 0 % — SIGNIFICANT CHANGE UP (ref 0–2)
BILIRUB SERPL-MCNC: 4.8 MG/DL — HIGH (ref 0.2–1.2)
BLD GP AB SCN SERPL QL: NEGATIVE — SIGNIFICANT CHANGE UP
BUN SERPL-MCNC: 8 MG/DL — SIGNIFICANT CHANGE UP (ref 7–23)
CALCIUM SERPL-MCNC: 8.8 MG/DL — SIGNIFICANT CHANGE UP (ref 8.4–10.5)
CHLORIDE SERPL-SCNC: 96 MMOL/L — SIGNIFICANT CHANGE UP (ref 96–108)
CO2 SERPL-SCNC: 26 MMOL/L — SIGNIFICANT CHANGE UP (ref 22–31)
CREAT SERPL-MCNC: 1.09 MG/DL — SIGNIFICANT CHANGE UP (ref 0.5–1.3)
EGFR: 93 ML/MIN/1.73M2 — SIGNIFICANT CHANGE UP
EOSINOPHIL # BLD AUTO: 0.2 K/UL — SIGNIFICANT CHANGE UP (ref 0–0.5)
EOSINOPHIL NFR BLD AUTO: 0.8 % — SIGNIFICANT CHANGE UP (ref 0–6)
FOLATE SERPL-MCNC: 10.4 NG/ML — SIGNIFICANT CHANGE UP
GLUCOSE SERPL-MCNC: 104 MG/DL — HIGH (ref 70–99)
HCT VFR BLD CALC: 28.6 % — LOW (ref 39–50)
HGB BLD-MCNC: 10.5 G/DL — LOW (ref 13–17)
HYPOCHROMIA BLD QL: SLIGHT — SIGNIFICANT CHANGE UP
LYMPHOCYTES # BLD AUTO: 0.65 K/UL — LOW (ref 1–3.3)
LYMPHOCYTES # BLD AUTO: 2.6 % — LOW (ref 13–44)
MACROCYTES BLD QL: SLIGHT — SIGNIFICANT CHANGE UP
MAGNESIUM SERPL-MCNC: 1.8 MG/DL — SIGNIFICANT CHANGE UP (ref 1.6–2.6)
MANUAL SMEAR VERIFICATION: SIGNIFICANT CHANGE UP
MCHC RBC-ENTMCNC: 28.2 PG — SIGNIFICANT CHANGE UP (ref 27–34)
MCHC RBC-ENTMCNC: 36.7 GM/DL — HIGH (ref 32–36)
MCV RBC AUTO: 76.9 FL — LOW (ref 80–100)
MONOCYTES # BLD AUTO: 5.74 K/UL — HIGH (ref 0–0.9)
MONOCYTES NFR BLD AUTO: 23.1 % — HIGH (ref 2–14)
NEUTROPHILS # BLD AUTO: 17.6 K/UL — HIGH (ref 1.8–7.4)
NEUTROPHILS NFR BLD AUTO: 70.9 % — SIGNIFICANT CHANGE UP (ref 43–77)
NRBC # BLD: 1 /100 WBCS — HIGH (ref 0–0)
NRBC # BLD: SIGNIFICANT CHANGE UP /100 WBCS (ref 0–0)
OVALOCYTES BLD QL SMEAR: SLIGHT — SIGNIFICANT CHANGE UP
PHOSPHATE SERPL-MCNC: 3 MG/DL — SIGNIFICANT CHANGE UP (ref 2.5–4.5)
PLAT MORPH BLD: ABNORMAL
PLATELET # BLD AUTO: 359 K/UL — SIGNIFICANT CHANGE UP (ref 150–400)
POIKILOCYTOSIS BLD QL AUTO: SLIGHT — SIGNIFICANT CHANGE UP
POTASSIUM SERPL-MCNC: 3.5 MMOL/L — SIGNIFICANT CHANGE UP (ref 3.5–5.3)
POTASSIUM SERPL-SCNC: 3.5 MMOL/L — SIGNIFICANT CHANGE UP (ref 3.5–5.3)
PROT SERPL-MCNC: 7.1 G/DL — SIGNIFICANT CHANGE UP (ref 6–8.3)
RBC # BLD: 3.72 M/UL — LOW (ref 4.2–5.8)
RBC # FLD: 16.5 % — HIGH (ref 10.3–14.5)
RBC BLD AUTO: ABNORMAL
RH IG SCN BLD-IMP: POSITIVE — SIGNIFICANT CHANGE UP
SCHISTOCYTES BLD QL AUTO: SLIGHT — SIGNIFICANT CHANGE UP
SODIUM SERPL-SCNC: 131 MMOL/L — LOW (ref 135–145)
SPHEROCYTES BLD QL SMEAR: SLIGHT — SIGNIFICANT CHANGE UP
TARGETS BLD QL SMEAR: SIGNIFICANT CHANGE UP
VARIANT LYMPHS # BLD: 2.6 % — SIGNIFICANT CHANGE UP (ref 0–6)
VIT B12 SERPL-MCNC: 454 PG/ML — SIGNIFICANT CHANGE UP (ref 232–1245)
WBC # BLD: 24.83 K/UL — HIGH (ref 3.8–10.5)
WBC # FLD AUTO: 24.83 K/UL — HIGH (ref 3.8–10.5)

## 2024-03-16 PROCEDURE — 71045 X-RAY EXAM CHEST 1 VIEW: CPT | Mod: 26

## 2024-03-16 PROCEDURE — 76700 US EXAM ABDOM COMPLETE: CPT | Mod: 26

## 2024-03-16 PROCEDURE — 83020 HEMOGLOBIN ELECTROPHORESIS: CPT | Mod: 26

## 2024-03-16 PROCEDURE — 99233 SBSQ HOSP IP/OBS HIGH 50: CPT | Mod: GC

## 2024-03-16 RX ORDER — VANCOMYCIN HCL 1 G
1750 VIAL (EA) INTRAVENOUS EVERY 12 HOURS
Refills: 0 | Status: DISCONTINUED | OUTPATIENT
Start: 2024-03-16 | End: 2024-03-16

## 2024-03-16 RX ORDER — VANCOMYCIN HCL 1 G
VIAL (EA) INTRAVENOUS
Refills: 0 | Status: DISCONTINUED | OUTPATIENT
Start: 2024-03-16 | End: 2024-03-16

## 2024-03-16 RX ORDER — SODIUM CHLORIDE 9 MG/ML
1000 INJECTION, SOLUTION INTRAVENOUS
Refills: 0 | Status: DISCONTINUED | OUTPATIENT
Start: 2024-03-16 | End: 2024-03-17

## 2024-03-16 RX ORDER — SODIUM CHLORIDE 9 MG/ML
1000 INJECTION, SOLUTION INTRAVENOUS
Refills: 0 | Status: DISCONTINUED | OUTPATIENT
Start: 2024-03-16 | End: 2024-03-16

## 2024-03-16 RX ORDER — PIPERACILLIN AND TAZOBACTAM 4; .5 G/20ML; G/20ML
4.5 INJECTION, POWDER, LYOPHILIZED, FOR SOLUTION INTRAVENOUS EVERY 8 HOURS
Refills: 0 | Status: DISCONTINUED | OUTPATIENT
Start: 2024-03-16 | End: 2024-03-16

## 2024-03-16 RX ORDER — PIPERACILLIN AND TAZOBACTAM 4; .5 G/20ML; G/20ML
4.5 INJECTION, POWDER, LYOPHILIZED, FOR SOLUTION INTRAVENOUS ONCE
Refills: 0 | Status: DISCONTINUED | OUTPATIENT
Start: 2024-03-16 | End: 2024-03-16

## 2024-03-16 RX ORDER — CEFTRIAXONE 500 MG/1
2000 INJECTION, POWDER, FOR SOLUTION INTRAMUSCULAR; INTRAVENOUS EVERY 24 HOURS
Refills: 0 | Status: DISCONTINUED | OUTPATIENT
Start: 2024-03-16 | End: 2024-03-18

## 2024-03-16 RX ORDER — PIPERACILLIN AND TAZOBACTAM 4; .5 G/20ML; G/20ML
4.5 INJECTION, POWDER, LYOPHILIZED, FOR SOLUTION INTRAVENOUS ONCE
Refills: 0 | Status: COMPLETED | OUTPATIENT
Start: 2024-03-16 | End: 2024-03-16

## 2024-03-16 RX ORDER — VANCOMYCIN HCL 1 G
1750 VIAL (EA) INTRAVENOUS ONCE
Refills: 0 | Status: DISCONTINUED | OUTPATIENT
Start: 2024-03-16 | End: 2024-03-16

## 2024-03-16 RX ADMIN — ENOXAPARIN SODIUM 40 MILLIGRAM(S): 100 INJECTION SUBCUTANEOUS at 10:35

## 2024-03-16 RX ADMIN — SENNA PLUS 2 TABLET(S): 8.6 TABLET ORAL at 22:53

## 2024-03-16 RX ADMIN — Medication 1 MILLIGRAM(S): at 12:01

## 2024-03-16 RX ADMIN — Medication 650 MILLIGRAM(S): at 06:08

## 2024-03-16 RX ADMIN — POLYETHYLENE GLYCOL 3350 17 GRAM(S): 17 POWDER, FOR SOLUTION ORAL at 10:35

## 2024-03-16 RX ADMIN — POLYETHYLENE GLYCOL 3350 17 GRAM(S): 17 POWDER, FOR SOLUTION ORAL at 22:53

## 2024-03-16 RX ADMIN — PIPERACILLIN AND TAZOBACTAM 200 GRAM(S): 4; .5 INJECTION, POWDER, LYOPHILIZED, FOR SOLUTION INTRAVENOUS at 07:18

## 2024-03-16 RX ADMIN — SODIUM CHLORIDE 125 MILLILITER(S): 9 INJECTION, SOLUTION INTRAVENOUS at 10:40

## 2024-03-16 RX ADMIN — CEFTRIAXONE 100 MILLIGRAM(S): 500 INJECTION, POWDER, FOR SOLUTION INTRAMUSCULAR; INTRAVENOUS at 18:50

## 2024-03-16 RX ADMIN — Medication 650 MILLIGRAM(S): at 07:08

## 2024-03-16 RX ADMIN — HYDROMORPHONE HYDROCHLORIDE 30 MILLILITER(S): 2 INJECTION INTRAMUSCULAR; INTRAVENOUS; SUBCUTANEOUS at 18:32

## 2024-03-16 NOTE — PROGRESS NOTE ADULT - PROBLEM SELECTOR PLAN 4
Possible CAP given CT scan findings of bilateral lower lobe consolidations  - MRSA, urine strep and legionella negative    Plan:  - CTX 1g daily for 5 days; d/c azithromycin 500mg daily for 3 days  - f/u sputum culture - CT scan showing bilateral lower lobe consolidations vs atelectasis; PE negative   DDx: CAP vs pain crisis     Plan:  - c/w oxygen by nasal cannula and wean as tolerated, goal is 02 saturation of 95%  - Incentive spirometry

## 2024-03-16 NOTE — PROGRESS NOTE ADULT - ASSESSMENT
33 year old male with PMHx of sickle cell disease presenting with three days of back, thorax, and chest pain, admitted for management of acute pain crisis, acute hypoxic respiratory failure, and possible CAP. Due to repeat CXR showing worsening atelectasis vs pneumonia iso uptitration of PCA pump and increasing 02 requirements ICU was consulted for transfer to [facility] for closer monitoring.

## 2024-03-16 NOTE — PROGRESS NOTE ADULT - ASSESSMENT
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 Hematology consulted for management recommendations.    #HbSc disease  #Vasoocclusive Crisis   This patient has had up to 4 pain crises requiring admission for help with pain control over the last 10 years, with regular follow up with Hematologist Dr. Wilmar Calles. He has never required any exchange transfusions, or medications. Also denies receiving simple transfusions.  Presentation appear similar to prior trasnfusions. Peripheral smear previously reviewed by fellow 3/15/24 showing multiple codocytes, no schistocytes.    Plan  - as patient symptomatically improving and oxygen requirement decreasing, minimal role for exchange or simple transfusion at this time.  -Continue with IV fluids  -Appreciate palliative recs for pain control  -Infectious workup and management as per primary team, would consider CT abdomen/pelvis if patient's flank pain persists despite pain regimen as abdominal ultrasound revealed cholelithiasis  - continue folic acid daily  - please note hydroxyurea with limited evidence in Hgb Sc disease, more evidence is shown in SS disease. Furthermore, onset of action is on the order of weeks to months, and so not likely to help in the acute setting. Will defer this management to OP hematologist Dr. Calles    To be discussed with Dr. Piña.

## 2024-03-16 NOTE — PROGRESS NOTE ADULT - PROBLEM SELECTOR PLAN 1
Fever to 100.4, , tachypneic at times, hypoxic to low 90s  Source likely CAP vs SIRS response to pain crisis   - RVP negative, strep/legionella negative, MRSA negative     Plan:  - treat plan as below   - f/u blood cultures  - abx as below POA, HR > 90, WBC > 12, acute hypoxic respiratory failure 2/2 sepsis  Source likely CAP vs SIRS response to pain crisis   - RVP negative, strep/legionella negative, MRSA negative     Plan:  - treat plan as below   - f/u blood cultures  - abx as below POA, HR > 90, WBC > 12, w/ elevated bilirubin > 2; 2/2 sepsis  Source as below  - RVP negative, strep/legionella negative, MRSA negative     Plan:  - treat plan as below   - f/u blood cultures  - abx as below

## 2024-03-16 NOTE — PROGRESS NOTE ADULT - PROBLEM SELECTOR PLAN 3
- CT scan showing bilateral lower lobe consolidations vs atelectasis; PE negative   DDx: CAP vs pain crisis     Plan:  - c/w oxygen by nasal cannula and wean as tolerated, goal is 02 saturation of 95%  - Incentive spirometry Presenting with 4 days of back, thorax, and chest pain and fever  Consistent with patient's previous pain crises, but more severe than prior episodes     Plan:  - c/w hydromorphone PCA pump and titrate as needed  - c/w home folic acid, vitamin B12, Vitamin D   - c/w 0.45% NS at 125 ml/hr and titrate as needed   - Monitor for acute chest syndrome  - f/u repeat CXR

## 2024-03-16 NOTE — PROGRESS NOTE ADULT - PROBLEM SELECTOR PLAN 5
- Hb 11.5, unknown baseline; No sign of hemolysis   - Never had transfusion in the past    Plan:  - c/w home folic acid 1g  - Maintain active T&S

## 2024-03-16 NOTE — PROGRESS NOTE ADULT - PROBLEM SELECTOR PLAN 6
#Elevated bilirubin  Bilirubin on admission 8.4, downtrending on 3/14  - Abdominal ultrasound showing cholelithiases    - Continue to trend

## 2024-03-16 NOTE — PROGRESS NOTE ADULT - SUBJECTIVE AND OBJECTIVE BOX
OVERNIGHT EVENTS LUZ    Subjective:  Patient was seen and examined this AM. States that he is feeling well. Denies any URI symptoms of cough, runny nose, difficulty breathing or wheezing, urinary symptoms, or diarrhea. States that the pain is well controlled while he is sitting still in bed and worsens when he moves around.     REVIEW OF SYSTEMS:  All other review of systems is negative unless indicated above.    OBJECTIVE:  T(C): 37.8 (03-15-24 @ 06:11), Max: 38.2 (03-14-24 @ 21:30)  HR: 104 (03-15-24 @ 06:11) (92 - 105)  BP: 133/73 (03-15-24 @ 06:11) (125/71 - 154/83)  RR: 18 (03-15-24 @ 06:11) (16 - 20)  SpO2: 92% (03-15-24 @ 06:11) (92% - 96%)  Daily Height in cm: 185.42 (14 Mar 2024 08:46)    Daily     Physical Exam:  General: in no acute distress, resting comfortably on 2L NC  HENT: icteric sclera   Lungs: CTA B/L. No wheezes, rales, or rhonchi  Cardiovascular: RRR. No murmurs, rubs, or gallops  Abdomen: Soft, non-tender non-distended; No rebound or guarding  Extremities: WWP, No clubbing, cyanosis or edema  Neurological: Alert and oriented x3  Skin: Warm and dry. No obvious rash     Medications:  MEDICATIONS  (STANDING):  azithromycin  IVPB 500 milliGRAM(s) IV Intermittent every 24 hours  cefTRIAXone   IVPB 2000 milliGRAM(s) IV Intermittent every 24 hours  dextrose 5% + sodium chloride 0.3%. 1000 milliLiter(s) (80 mL/Hr) IV Continuous <Continuous>  enoxaparin Injectable 40 milliGRAM(s) SubCutaneous every 24 hours  folic acid 1 milliGRAM(s) Oral daily  HYDROmorphone PCA (1 mG/mL) 30 milliLiter(s) PCA Continuous PCA Continuous  influenza   Vaccine 0.5 milliLiter(s) IntraMuscular once  polyethylene glycol 3350 17 Gram(s) Oral daily  senna 2 Tablet(s) Oral at bedtime    MEDICATIONS  (PRN):  acetaminophen     Tablet .. 650 milliGRAM(s) Oral every 6 hours PRN Temp greater or equal to 38C (100.4F), Mild Pain (1 - 3)  naloxone Injectable 0.1 milliGRAM(s) IV Push every 3 minutes PRN For ANY of the following changes in patient status:  A. RR LESS THAN 10 breaths per minute, B. Oxygen saturation LESS THAN 90%, C. Sedation score of 6  ondansetron Injectable 4 milliGRAM(s) IV Push every 6 hours PRN Nausea      Labs:                        11.5   23.32 )-----------( 427      ( 14 Mar 2024 12:00 )             31.6     03-14    134<L>  |  100  |  11  ----------------------------<  113<H>  3.8   |  26  |  0.96    Ca    8.9      14 Mar 2024 12:00  Phos  2.1     03-14  Mg     1.9     03-14    TPro  7.3  /  Alb  3.7  /  TBili  7.3<H>  /  DBili  x   /  AST  60<H>  /  ALT  74<H>  /  AlkPhos  80  03-14    PT/INR - ( 13 Mar 2024 17:46 )   PT: 13.4 sec;   INR: 1.19          PTT - ( 13 Mar 2024 17:46 )  PTT:27.8 sec  Urinalysis Basic - ( 14 Mar 2024 12:00 )    Color: x / Appearance: x / SG: x / pH: x  Gluc: 113 mg/dL / Ketone: x  / Bili: x / Urobili: x   Blood: x / Protein: x / Nitrite: x   Leuk Esterase: x / RBC: x / WBC x   Sq Epi: x / Non Sq Epi: x / Bacteria: x      SARS-CoV-2: NotDetec (14 Mar 2024 18:35)      Radiology: Reviewed OVERNIGHT EVENTS: Fever of 103 overnight    Subjective:  Patient was seen and examined this AM. Reports that he feels improved. Is able to take deeper breaths. Denies SOB, CP. ROS is otherwise negative.     REVIEW OF SYSTEMS:  All other review of systems is negative unless indicated above.      Vital Signs Last 24 Hrs  T(C): 37.3 (16 Mar 2024 07:08), Max: 39.4 (16 Mar 2024 06:04)  T(F): 99.2 (16 Mar 2024 07:08), Max: 103 (16 Mar 2024 06:04)  HR: 99 (16 Mar 2024 06:04) (86 - 106)  BP: 135/74 (16 Mar 2024 06:04) (124/75 - 135/74)  BP(mean): --  RR: 17 (16 Mar 2024 06:10) (11 - 20)  SpO2: 98% (16 Mar 2024 14:06) (88% - 98%)    Parameters below as of 16 Mar 2024 14:06  Patient On (Oxygen Delivery Method): nasal cannula  O2 Flow (L/min): 2          Physical Exam:  General: in no acute distress, resting comfortably on 2L NC  HENT: icteric sclera   Lungs: CTA B/L. No wheezes, rales, or rhonchi  Cardiovascular: RRR. No murmurs, rubs, or gallops  Abdomen: Soft, non-tender non-distended; No rebound or guarding  Extremities: WWP, No clubbing, cyanosis or edema  Neurological: Alert and oriented x3  Skin: Warm and dry. No obvious rash   Psych: normal affect        MEDICATIONS  (STANDING):  cefTRIAXone   IVPB 2000 milliGRAM(s) IV Intermittent every 24 hours  enoxaparin Injectable 40 milliGRAM(s) SubCutaneous every 24 hours  folic acid 1 milliGRAM(s) Oral daily  HYDROmorphone PCA (1 mG/mL) 30 milliLiter(s) PCA Continuous PCA Continuous  influenza   Vaccine 0.5 milliLiter(s) IntraMuscular once  polyethylene glycol 3350 17 Gram(s) Oral every 12 hours  senna 2 Tablet(s) Oral at bedtime  sodium chloride 0.45%. 1000 milliLiter(s) (125 mL/Hr) IV Continuous <Continuous>    MEDICATIONS  (PRN):  acetaminophen     Tablet .. 650 milliGRAM(s) Oral every 6 hours PRN Temp greater or equal to 38C (100.4F), Mild Pain (1 - 3)  naloxone Injectable 0.1 milliGRAM(s) IV Push every 3 minutes PRN For ANY of the following changes in patient status:  A. RR LESS THAN 10 breaths per minute, B. Oxygen saturation LESS THAN 90%, C. Sedation score of 6  ondansetron Injectable 4 milliGRAM(s) IV Push every 6 hours PRN Nausea        Labs:                          10.5   24.83 )-----------( 359      ( 16 Mar 2024 05:30 )             28.6   03-16    131<L>  |  96  |  8   ----------------------------<  104<H>  3.5   |  26  |  1.09    Ca    8.8      16 Mar 2024 05:30  Phos  3.0     03-16  Mg     1.8     03-16    TPro  7.1  /  Alb  3.5  /  TBili  4.8<H>  /  DBili  x   /  AST  28  /  ALT  42  /  AlkPhos  72  03-16             PTT - ( 13 Mar 2024 17:46 )  PTT:27.8 sec  Urinalysis Basic - ( 14 Mar 2024 12:00 )    Color: x / Appearance: x / SG: x / pH: x  Gluc: 113 mg/dL / Ketone: x  / Bili: x / Urobili: x   Blood: x / Protein: x / Nitrite: x   Leuk Esterase: x / RBC: x / WBC x   Sq Epi: x / Non Sq Epi: x / Bacteria: x      SARS-CoV-2: NotDetec (14 Mar 2024 18:35)      Radiology: Reviewed

## 2024-03-16 NOTE — PROGRESS NOTE ADULT - PROBLEM SELECTOR PLAN 2
Presenting with 4 days of back, thorax, and chest pain and fever  Consistent with patient's previous pain crises, but more severe than prior episodes     Plan:  - c/w hydromorphone PCA pump and titrate as needed  - c/w home folic acid, vitamin B12, Vitamin D   - c/w 0.45% NS at 125 ml/hr and titrate as needed   - Monitor for acute chest syndrome  - f/u repeat CXR Possible pneumonia given CT scan findings of bilateral lower lobe consolidations  - MRSA, urine strep and legionella negative    Plan:  - CTX 1g daily for 5 days; stop azithromycin  - f/u sputum culture

## 2024-03-16 NOTE — PROGRESS NOTE ADULT - SUBJECTIVE AND OBJECTIVE BOX
Hematology Oncology Progress Note    Interval History:    SUBJECTIVE:   Patient seen and examined at bedside.  Patient on 3L NC, reports symptomatic improvement  WBC improving 28 --> 24  Hgb 10.5     OBJECTIVE:    VITAL SIGNS:  ICU Vital Signs Last 24 Hrs  T(C): 37.3 (16 Mar 2024 07:08), Max: 39.4 (16 Mar 2024 06:04)  T(F): 99.2 (16 Mar 2024 07:08), Max: 103 (16 Mar 2024 06:04)  HR: 99 (16 Mar 2024 06:04) (86 - 106)  BP: 135/74 (16 Mar 2024 06:04) (124/75 - 135/74)  BP(mean): --  ABP: --  ABP(mean): --  RR: 17 (16 Mar 2024 06:10) (11 - 20)  SpO2: 94% (16 Mar 2024 06:10) (88% - 97%)    O2 Parameters below as of 16 Mar 2024 06:10  Patient On (Oxygen Delivery Method): nasal cannula  O2 Flow (L/min): 6            03-15 @ 07:01  -  03-16 @ 07:00  --------------------------------------------------------  IN: 1600 mL / OUT: 925 mL / NET: 675 mL      CAPILLARY BLOOD GLUCOSE          PHYSICAL EXAM:  GENERAL: stoic affect, sitting upright in bed  HEAD:  Atraumatic, Normocephalic  EYES: EOMI, PERRLA, conjunctiva and sclera clear  NECK: Supple, No JVD  CHEST/LUNG: Clear to auscultation bilaterally; bibasilar crackles  HEART: tachycardic  ABDOMEN: Soft, Nontender, Nondistended; Bowel sounds present  NEUROLOGY: non-focal  SKIN: No rashes or lesions      MEDICATIONS:  MEDICATIONS  (STANDING):  cefTRIAXone   IVPB 2000 milliGRAM(s) IV Intermittent every 24 hours  enoxaparin Injectable 40 milliGRAM(s) SubCutaneous every 24 hours  folic acid 1 milliGRAM(s) Oral daily  HYDROmorphone PCA (1 mG/mL) 30 milliLiter(s) PCA Continuous PCA Continuous  influenza   Vaccine 0.5 milliLiter(s) IntraMuscular once  polyethylene glycol 3350 17 Gram(s) Oral every 12 hours  senna 2 Tablet(s) Oral at bedtime  sodium chloride 0.45%. 1000 milliLiter(s) (125 mL/Hr) IV Continuous <Continuous>    MEDICATIONS  (PRN):  acetaminophen     Tablet .. 650 milliGRAM(s) Oral every 6 hours PRN Temp greater or equal to 38C (100.4F), Mild Pain (1 - 3)  naloxone Injectable 0.1 milliGRAM(s) IV Push every 3 minutes PRN For ANY of the following changes in patient status:  A. RR LESS THAN 10 breaths per minute, B. Oxygen saturation LESS THAN 90%, C. Sedation score of 6  ondansetron Injectable 4 milliGRAM(s) IV Push every 6 hours PRN Nausea      ALLERGIES:  Allergies    No Known Allergies    Intolerances        LABS:                        10.5   24.83 )-----------( 359      ( 16 Mar 2024 05:30 )             28.6     03-16    131<L>  |  96  |  8   ----------------------------<  104<H>  3.5   |  26  |  1.09    Ca    8.8      16 Mar 2024 05:30  Phos  3.0     03-16  Mg     1.8     03-16    TPro  7.1  /  Alb  3.5  /  TBili  4.8<H>  /  DBili  x   /  AST  28  /  ALT  42  /  AlkPhos  72  03-16      Urinalysis Basic - ( 16 Mar 2024 05:30 )    Color: x / Appearance: x / SG: x / pH: x  Gluc: 104 mg/dL / Ketone: x  / Bili: x / Urobili: x   Blood: x / Protein: x / Nitrite: x   Leuk Esterase: x / RBC: x / WBC x   Sq Epi: x / Non Sq Epi: x / Bacteria: x            Culture - Blood (collected 03-14-24 @ 06:14)  Source: .Blood Blood-Peripheral  Preliminary Report (03-15-24 @ 13:03):    No growth at 24 hours    Culture - Blood (collected 03-14-24 @ 06:14)  Source: .Blood Blood-Peripheral  Preliminary Report (03-15-24 @ 13:03):    No growth at 24 hours            RADIOLOGY & ADDITIONAL TESTS: Reviewed.

## 2024-03-17 LAB
ANION GAP SERPL CALC-SCNC: 12 MMOL/L — SIGNIFICANT CHANGE UP (ref 5–17)
ANISOCYTOSIS BLD QL: SLIGHT — SIGNIFICANT CHANGE UP
BASOPHILS # BLD AUTO: 0.18 K/UL — SIGNIFICANT CHANGE UP (ref 0–0.2)
BASOPHILS NFR BLD AUTO: 0.9 % — SIGNIFICANT CHANGE UP (ref 0–2)
BUN SERPL-MCNC: 8 MG/DL — SIGNIFICANT CHANGE UP (ref 7–23)
CALCIUM SERPL-MCNC: 9.6 MG/DL — SIGNIFICANT CHANGE UP (ref 8.4–10.5)
CHLORIDE SERPL-SCNC: 97 MMOL/L — SIGNIFICANT CHANGE UP (ref 96–108)
CO2 SERPL-SCNC: 26 MMOL/L — SIGNIFICANT CHANGE UP (ref 22–31)
CREAT SERPL-MCNC: 0.97 MG/DL — SIGNIFICANT CHANGE UP (ref 0.5–1.3)
EGFR: 107 ML/MIN/1.73M2 — SIGNIFICANT CHANGE UP
EOSINOPHIL # BLD AUTO: 0.18 K/UL — SIGNIFICANT CHANGE UP (ref 0–0.5)
EOSINOPHIL NFR BLD AUTO: 0.9 % — SIGNIFICANT CHANGE UP (ref 0–6)
GLUCOSE SERPL-MCNC: 104 MG/DL — HIGH (ref 70–99)
HCT VFR BLD CALC: 29.5 % — LOW (ref 39–50)
HGB BLD-MCNC: 10.8 G/DL — LOW (ref 13–17)
HYPOCHROMIA BLD QL: SLIGHT — SIGNIFICANT CHANGE UP
LYMPHOCYTES # BLD AUTO: 0.87 K/UL — LOW (ref 1–3.3)
LYMPHOCYTES # BLD AUTO: 4.3 % — LOW (ref 13–44)
MACROCYTES BLD QL: SLIGHT — SIGNIFICANT CHANGE UP
MAGNESIUM SERPL-MCNC: 2.3 MG/DL — SIGNIFICANT CHANGE UP (ref 1.6–2.6)
MANUAL SMEAR VERIFICATION: SIGNIFICANT CHANGE UP
MCHC RBC-ENTMCNC: 27.9 PG — SIGNIFICANT CHANGE UP (ref 27–34)
MCHC RBC-ENTMCNC: 36.6 GM/DL — HIGH (ref 32–36)
MCV RBC AUTO: 76.2 FL — LOW (ref 80–100)
MICROCYTES BLD QL: SLIGHT — SIGNIFICANT CHANGE UP
MONOCYTES # BLD AUTO: 4.38 K/UL — HIGH (ref 0–0.9)
MONOCYTES NFR BLD AUTO: 21.7 % — HIGH (ref 2–14)
NEUTROPHILS # BLD AUTO: 14.57 K/UL — HIGH (ref 1.8–7.4)
NEUTROPHILS NFR BLD AUTO: 72.2 % — SIGNIFICANT CHANGE UP (ref 43–77)
OVALOCYTES BLD QL SMEAR: SLIGHT — SIGNIFICANT CHANGE UP
PHOSPHATE SERPL-MCNC: 3.1 MG/DL — SIGNIFICANT CHANGE UP (ref 2.5–4.5)
PLAT MORPH BLD: NORMAL — SIGNIFICANT CHANGE UP
PLATELET # BLD AUTO: 548 K/UL — HIGH (ref 150–400)
POIKILOCYTOSIS BLD QL AUTO: SIGNIFICANT CHANGE UP
POLYCHROMASIA BLD QL SMEAR: SLIGHT — SIGNIFICANT CHANGE UP
POTASSIUM SERPL-MCNC: 3.8 MMOL/L — SIGNIFICANT CHANGE UP (ref 3.5–5.3)
POTASSIUM SERPL-SCNC: 3.8 MMOL/L — SIGNIFICANT CHANGE UP (ref 3.5–5.3)
RBC # BLD: 3.87 M/UL — LOW (ref 4.2–5.8)
RBC # FLD: 16.7 % — HIGH (ref 10.3–14.5)
RBC BLD AUTO: ABNORMAL
SICKLE CELLS BLD QL SMEAR: SLIGHT — SIGNIFICANT CHANGE UP
SODIUM SERPL-SCNC: 135 MMOL/L — SIGNIFICANT CHANGE UP (ref 135–145)
SPHEROCYTES BLD QL SMEAR: SLIGHT — SIGNIFICANT CHANGE UP
TARGETS BLD QL SMEAR: SIGNIFICANT CHANGE UP
WBC # BLD: 20.18 K/UL — HIGH (ref 3.8–10.5)
WBC # FLD AUTO: 20.18 K/UL — HIGH (ref 3.8–10.5)

## 2024-03-17 PROCEDURE — 99232 SBSQ HOSP IP/OBS MODERATE 35: CPT | Mod: GC

## 2024-03-17 RX ORDER — OXYCODONE HYDROCHLORIDE 5 MG/1
5 TABLET ORAL EVERY 4 HOURS
Refills: 0 | Status: DISCONTINUED | OUTPATIENT
Start: 2024-03-17 | End: 2024-03-18

## 2024-03-17 RX ADMIN — Medication 1 MILLIGRAM(S): at 12:05

## 2024-03-17 RX ADMIN — ENOXAPARIN SODIUM 40 MILLIGRAM(S): 100 INJECTION SUBCUTANEOUS at 12:04

## 2024-03-17 RX ADMIN — CEFTRIAXONE 100 MILLIGRAM(S): 500 INJECTION, POWDER, FOR SOLUTION INTRAMUSCULAR; INTRAVENOUS at 15:34

## 2024-03-17 RX ADMIN — SODIUM CHLORIDE 125 MILLILITER(S): 9 INJECTION, SOLUTION INTRAVENOUS at 07:53

## 2024-03-17 NOTE — PROGRESS NOTE ADULT - PROBLEM SELECTOR PLAN 1
POA, HR > 90, WBC > 12, w/ elevated bilirubin > 2; 2/2 sepsis  Source as below  - RVP negative, strep/legionella negative, MRSA negative     Plan:  - treat plan as below   - f/u blood cultures  - abx as below

## 2024-03-17 NOTE — PROGRESS NOTE ADULT - PROBLEM SELECTOR PLAN 4
- CT scan showing bilateral lower lobe consolidations vs atelectasis; PE negative   DDx: CAP vs pain crisis. B lines on POCUS    Plan:  - c/w oxygen by nasal cannula and wean as tolerated, goal is 02 saturation of 95%  - Incentive spirometry

## 2024-03-17 NOTE — PROGRESS NOTE ADULT - PROBLEM SELECTOR PLAN 3
Presenting with 4 days of back, thorax, and chest pain and fever  Consistent with patient's previous pain crises, but more severe than prior episodes     Plan:  - stopped PCA and IVF  -Oxy 5 Q4 prn for severe pain  - c/w home folic acid, vitamin B12, Vitamin D   - Monitor for acute chest syndrome  - f/u repeat CXR

## 2024-03-17 NOTE — PROGRESS NOTE ADULT - SUBJECTIVE AND OBJECTIVE BOX
SUBJECTIVE/OVERNIGHT EVENTS: No acute overnight events. Pt seen in AM at bedside, resting comfortably in bed, and does not appear to be in any acute distress. When asked, pt denies any recent or active fever, chills, nausea, vomiting, headache, acute sob, chest pain, abdominal pain, genitourinary sx, extremity pain or swelling.    VITAL SIGNS:  Vital Signs Last 24 Hrs  T(C): 36.7 (17 Mar 2024 16:26), Max: 37.6 (16 Mar 2024 21:20)  T(F): 98.1 (17 Mar 2024 16:26), Max: 99.6 (16 Mar 2024 21:20)  HR: 102 (17 Mar 2024 16:26) (92 - 103)  BP: 122/80 (17 Mar 2024 16:26) (112/63 - 143/83)  BP(mean): --  RR: 18 (17 Mar 2024 16:26) (18 - 20)  SpO2: 93% (17 Mar 2024 16:26) (88% - 96%)    Parameters below as of 17 Mar 2024 10:18  Patient On (Oxygen Delivery Method): room air      PHYSICAL EXAM:  General: in no acute distress, resting comfortably on 2L NC  HENT: icteric sclera   Lungs: CTA B/L. No wheezes, rales, or rhonchi  Cardiovascular: RRR. No murmurs, rubs, or gallops  Abdomen: Soft, non-tender non-distended; No rebound or guarding  Extremities: WWP, No clubbing, cyanosis or edema  Neurological: Alert and oriented x3  Skin: Warm and dry. No obvious rash   Psych: normal affect    MEDICATIONS:  MEDICATIONS  (STANDING):  cefTRIAXone   IVPB 2000 milliGRAM(s) IV Intermittent every 24 hours  enoxaparin Injectable 40 milliGRAM(s) SubCutaneous every 24 hours  folic acid 1 milliGRAM(s) Oral daily  influenza   Vaccine 0.5 milliLiter(s) IntraMuscular once  polyethylene glycol 3350 17 Gram(s) Oral every 12 hours  senna 2 Tablet(s) Oral at bedtime    MEDICATIONS  (PRN):  acetaminophen     Tablet .. 650 milliGRAM(s) Oral every 6 hours PRN Temp greater or equal to 38C (100.4F), Mild Pain (1 - 3)  naloxone Injectable 0.1 milliGRAM(s) IV Push every 3 minutes PRN For ANY of the following changes in patient status:  A. RR LESS THAN 10 breaths per minute, B. Oxygen saturation LESS THAN 90%, C. Sedation score of 6  ondansetron Injectable 4 milliGRAM(s) IV Push every 6 hours PRN Nausea  oxyCODONE    IR 5 milliGRAM(s) Oral every 4 hours PRN Severe Pain (7 - 10)      ALLERGIES:  Allergies    No Known Allergies    Intolerances        LABS:                        10.8   20.18 )-----------( 548      ( 17 Mar 2024 05:30 )             29.5     03-17    135  |  97  |  8   ----------------------------<  104<H>  3.8   |  26  |  0.97    Ca    9.6      17 Mar 2024 05:30  Phos  3.1     03-17  Mg     2.3     03-17    TPro  7.1  /  Alb  3.5  /  TBili  4.8<H>  /  DBili  x   /  AST  28  /  ALT  42  /  AlkPhos  72  03-16        RADIOLOGY & ADDITIONAL TESTS: Reviewed.

## 2024-03-17 NOTE — PROGRESS NOTE ADULT - ASSESSMENT
33 year old male with PMHx of sickle cell disease presenting with three days of back, thorax, and chest pain, admitted for management of acute pain crisis, acute hypoxic respiratory failure, and possible CAP. Now improved no longer in pain crisis

## 2024-03-17 NOTE — PROGRESS NOTE ADULT - ATTENDING COMMENTS
Patient was seen and examined at bedside on 3/17/2024 at 10 am. Patient continues to feel that his pain is well controlled. Reports that his breathing is improved as well although he is not back to his baseline. Denies N/V, abdominal pain but does report constipation. Last BM yesterday. ROS is otherwise negative. Vitals, labwork and pertinent imaging reviewed. Exam - NAD, AAO x 4, PERRLA, EOMI, MMM, supple neck, chest - bibasilar crackles, CV - rrr, s1s2, no m/r/g, no edema, abd - soft, NTND, + BS, ext - wwp, psych - normal affect    Plan:  -C/w pain control - has only used PCA 4 times total overnight; can transition to PO pain medications, c/w IS  -C/w abx, blood cultures - NGTD  -Folic acid  -Hematology was consulted  -OOB  -Stop IVF  -Oxygen form
HEMATOLOGY ATTENDING NOTE    In brief, patient is a 32yo man with h/o HbSc disease, for which he regularly follows with a hematologist, Dr Wilmar Calles, admitted at [facility] in pain crisis for which hematology is consulted for. Patient with occasional pain crisis, not on hydrea; over past ~10yrs, 4 admissions for pain crisis with infection usually as the inciting event. Patient sob/lomeli with desaturation requiring supplemental O2 at 4L currently, also ongoing, flank pain and fevers, CTA negative for PE with only focal atelectasis at bases. Anemia is mild, and reticulocyte count elevated, no concern for aplastic crisis; wbc elevated and lomeli with increasing O2 requirement concerning for acute chest but no infiltrate seen on chest imaging.     Plan  Would evaluate for another source of infection, other than the lung - with CT abdomen.   Continue to IVF, pain regimen, bowel regimen given opioid medications, and antibiotics as per primary team.   Check hemoglobin electrophoresis, check micronutrient levels (folate, VB12, methylmalonic acid), and repeat reticulocyte count.  Monitor closely patient's respiratory status and if worsening please contact hematology, ICU right away. In case of acute chest (for which a lung infection needs confirmation/presence of infiltrate/consolidation on chest imaging is needed), plasma exchange may be needed; hydrea is implemented in long term management of frequent pain crises or ACS as it has a slower onset of action.     Hematology to continue to follow closely.
Patient was seen and examined at bedside on 3/15/2024 at 2 pm. Patient continues to feel that pain is not always well controlled. Denies N/V, abdominal pain but does report constipation. Last BM ~ 4 days ago. ROS is otherwise negative. Vitals, labwork and pertinent imaging reviewed. Exam - NAD, AAO x 4, PERRLA, EOMI, MMM, supple neck, chest - bibasilar crackles, CV - rrr, s1s2, no m/r/g, no edema, abd - soft, NTND, + BS, ext - wwp, psych - normal affect    Plan:  -C/w pain control (PCA), IS  -C/w abx, f/u blood cultures  -Folic acid  -Hematology was consulted  -OOB  -Low threshold for ICU consult

## 2024-03-17 NOTE — PROGRESS NOTE ADULT - PROBLEM SELECTOR PLAN 7
F: 0.45%  cc/hr  E: Replete PRN  N: Regular diet   DVT: Lovenox 40mg daily   Code: FULL

## 2024-03-17 NOTE — PROGRESS NOTE ADULT - PROBLEM SELECTOR PLAN 2
Possible pneumonia given CT scan findings of bilateral lower lobe consolidations  - MRSA, urine strep and legionella negative    Plan:  - CTX 1g daily for 5 days; stop azithromycin  - f/u sputum culture

## 2024-03-18 ENCOUNTER — TRANSCRIPTION ENCOUNTER (OUTPATIENT)
Age: 34
End: 2024-03-18

## 2024-03-18 ENCOUNTER — RESULT REVIEW (OUTPATIENT)
Age: 34
End: 2024-03-18

## 2024-03-18 VITALS
OXYGEN SATURATION: 97 % | DIASTOLIC BLOOD PRESSURE: 78 MMHG | SYSTOLIC BLOOD PRESSURE: 129 MMHG | TEMPERATURE: 98 F | HEART RATE: 100 BPM | RESPIRATION RATE: 18 BRPM

## 2024-03-18 LAB
ANION GAP SERPL CALC-SCNC: 11 MMOL/L — SIGNIFICANT CHANGE UP (ref 5–17)
ANISOCYTOSIS BLD QL: SLIGHT — SIGNIFICANT CHANGE UP
BASOPHILS # BLD AUTO: 0.32 K/UL — HIGH (ref 0–0.2)
BASOPHILS NFR BLD AUTO: 1.7 % — SIGNIFICANT CHANGE UP (ref 0–2)
BUN SERPL-MCNC: 10 MG/DL — SIGNIFICANT CHANGE UP (ref 7–23)
CALCIUM SERPL-MCNC: 9.3 MG/DL — SIGNIFICANT CHANGE UP (ref 8.4–10.5)
CHLORIDE SERPL-SCNC: 103 MMOL/L — SIGNIFICANT CHANGE UP (ref 96–108)
CO2 SERPL-SCNC: 26 MMOL/L — SIGNIFICANT CHANGE UP (ref 22–31)
CREAT SERPL-MCNC: 1.02 MG/DL — SIGNIFICANT CHANGE UP (ref 0.5–1.3)
DACRYOCYTES BLD QL SMEAR: SLIGHT — SIGNIFICANT CHANGE UP
EGFR: 101 ML/MIN/1.73M2 — SIGNIFICANT CHANGE UP
EOSINOPHIL # BLD AUTO: 0.17 K/UL — SIGNIFICANT CHANGE UP (ref 0–0.5)
EOSINOPHIL NFR BLD AUTO: 0.9 % — SIGNIFICANT CHANGE UP (ref 0–6)
GIANT PLATELETS BLD QL SMEAR: PRESENT — SIGNIFICANT CHANGE UP
GLUCOSE SERPL-MCNC: 91 MG/DL — SIGNIFICANT CHANGE UP (ref 70–99)
HCT VFR BLD CALC: 27.1 % — LOW (ref 39–50)
HGB BLD-MCNC: 10 G/DL — LOW (ref 13–17)
HGB S BLD QL: POSITIVE
HYPOCHROMIA BLD QL: SLIGHT — SIGNIFICANT CHANGE UP
LYMPHOCYTES # BLD AUTO: 13.9 % — SIGNIFICANT CHANGE UP (ref 13–44)
LYMPHOCYTES # BLD AUTO: 2.63 K/UL — SIGNIFICANT CHANGE UP (ref 1–3.3)
MACROCYTES BLD QL: SLIGHT — SIGNIFICANT CHANGE UP
MAGNESIUM SERPL-MCNC: 2.3 MG/DL — SIGNIFICANT CHANGE UP (ref 1.6–2.6)
MANUAL SMEAR VERIFICATION: SIGNIFICANT CHANGE UP
MCHC RBC-ENTMCNC: 28.1 PG — SIGNIFICANT CHANGE UP (ref 27–34)
MCHC RBC-ENTMCNC: 36.9 GM/DL — HIGH (ref 32–36)
MCV RBC AUTO: 76.1 FL — LOW (ref 80–100)
MICROCYTES BLD QL: SLIGHT — SIGNIFICANT CHANGE UP
MONOCYTES # BLD AUTO: 3.61 K/UL — HIGH (ref 0–0.9)
MONOCYTES NFR BLD AUTO: 19.1 % — HIGH (ref 2–14)
NEUTROPHILS # BLD AUTO: 12.18 K/UL — HIGH (ref 1.8–7.4)
NEUTROPHILS NFR BLD AUTO: 64.4 % — SIGNIFICANT CHANGE UP (ref 43–77)
OVALOCYTES BLD QL SMEAR: SLIGHT — SIGNIFICANT CHANGE UP
PHOSPHATE SERPL-MCNC: 3.8 MG/DL — SIGNIFICANT CHANGE UP (ref 2.5–4.5)
PLAT MORPH BLD: ABNORMAL
PLATELET # BLD AUTO: 547 K/UL — HIGH (ref 150–400)
POIKILOCYTOSIS BLD QL AUTO: SIGNIFICANT CHANGE UP
POLYCHROMASIA BLD QL SMEAR: SLIGHT — SIGNIFICANT CHANGE UP
POTASSIUM SERPL-MCNC: 4.1 MMOL/L — SIGNIFICANT CHANGE UP (ref 3.5–5.3)
POTASSIUM SERPL-SCNC: 4.1 MMOL/L — SIGNIFICANT CHANGE UP (ref 3.5–5.3)
RBC # BLD: 3.56 M/UL — LOW (ref 4.2–5.8)
RBC # FLD: 17 % — HIGH (ref 10.3–14.5)
RBC BLD AUTO: ABNORMAL
SICKLE CELLS BLD QL SMEAR: SLIGHT — SIGNIFICANT CHANGE UP
SODIUM SERPL-SCNC: 140 MMOL/L — SIGNIFICANT CHANGE UP (ref 135–145)
SOLUBILITY: POSITIVE
TARGETS BLD QL SMEAR: SLIGHT — SIGNIFICANT CHANGE UP
WBC # BLD: 18.92 K/UL — HIGH (ref 3.8–10.5)
WBC # FLD AUTO: 18.92 K/UL — HIGH (ref 3.8–10.5)

## 2024-03-18 PROCEDURE — 87449 NOS EACH ORGANISM AG IA: CPT

## 2024-03-18 PROCEDURE — 84100 ASSAY OF PHOSPHORUS: CPT

## 2024-03-18 PROCEDURE — 80048 BASIC METABOLIC PNL TOTAL CA: CPT

## 2024-03-18 PROCEDURE — 82550 ASSAY OF CK (CPK): CPT

## 2024-03-18 PROCEDURE — 85025 COMPLETE CBC W/AUTO DIFF WBC: CPT

## 2024-03-18 PROCEDURE — 82553 CREATINE MB FRACTION: CPT

## 2024-03-18 PROCEDURE — 94660 CPAP INITIATION&MGMT: CPT

## 2024-03-18 PROCEDURE — 93306 TTE W/DOPPLER COMPLETE: CPT | Mod: 26

## 2024-03-18 PROCEDURE — 83735 ASSAY OF MAGNESIUM: CPT

## 2024-03-18 PROCEDURE — 87040 BLOOD CULTURE FOR BACTERIA: CPT

## 2024-03-18 PROCEDURE — 76700 US EXAM ABDOM COMPLETE: CPT

## 2024-03-18 PROCEDURE — 82247 BILIRUBIN TOTAL: CPT

## 2024-03-18 PROCEDURE — 71275 CT ANGIOGRAPHY CHEST: CPT | Mod: MC

## 2024-03-18 PROCEDURE — 83550 IRON BINDING TEST: CPT

## 2024-03-18 PROCEDURE — 85045 AUTOMATED RETICULOCYTE COUNT: CPT

## 2024-03-18 PROCEDURE — 83010 ASSAY OF HAPTOGLOBIN QUANT: CPT

## 2024-03-18 PROCEDURE — 87899 AGENT NOS ASSAY W/OPTIC: CPT

## 2024-03-18 PROCEDURE — 85379 FIBRIN DEGRADATION QUANT: CPT

## 2024-03-18 PROCEDURE — 83615 LACTATE (LD) (LDH) ENZYME: CPT

## 2024-03-18 PROCEDURE — 99239 HOSP IP/OBS DSCHRG MGMT >30: CPT | Mod: GC

## 2024-03-18 PROCEDURE — 83020 HEMOGLOBIN ELECTROPHORESIS: CPT

## 2024-03-18 PROCEDURE — 99285 EMERGENCY DEPT VISIT HI MDM: CPT | Mod: 25

## 2024-03-18 PROCEDURE — 96374 THER/PROPH/DIAG INJ IV PUSH: CPT

## 2024-03-18 PROCEDURE — 71045 X-RAY EXAM CHEST 1 VIEW: CPT

## 2024-03-18 PROCEDURE — 86901 BLOOD TYPING SEROLOGIC RH(D): CPT

## 2024-03-18 PROCEDURE — 93306 TTE W/DOPPLER COMPLETE: CPT

## 2024-03-18 PROCEDURE — 82746 ASSAY OF FOLIC ACID SERUM: CPT

## 2024-03-18 PROCEDURE — 85027 COMPLETE CBC AUTOMATED: CPT

## 2024-03-18 PROCEDURE — 0225U NFCT DS DNA&RNA 21 SARSCOV2: CPT

## 2024-03-18 PROCEDURE — 82728 ASSAY OF FERRITIN: CPT

## 2024-03-18 PROCEDURE — 82607 VITAMIN B-12: CPT

## 2024-03-18 PROCEDURE — 84145 PROCALCITONIN (PCT): CPT

## 2024-03-18 PROCEDURE — 82248 BILIRUBIN DIRECT: CPT

## 2024-03-18 PROCEDURE — 83540 ASSAY OF IRON: CPT

## 2024-03-18 PROCEDURE — 86850 RBC ANTIBODY SCREEN: CPT

## 2024-03-18 PROCEDURE — 36415 COLL VENOUS BLD VENIPUNCTURE: CPT

## 2024-03-18 PROCEDURE — 86900 BLOOD TYPING SEROLOGIC ABO: CPT

## 2024-03-18 PROCEDURE — 80053 COMPREHEN METABOLIC PANEL: CPT

## 2024-03-18 PROCEDURE — 84484 ASSAY OF TROPONIN QUANT: CPT

## 2024-03-18 RX ORDER — CEFPODOXIME PROXETIL 100 MG
1 TABLET ORAL
Qty: 2 | Refills: 0
Start: 2024-03-18 | End: 2024-03-18

## 2024-03-18 NOTE — PROGRESS NOTE ADULT - SUBJECTIVE AND OBJECTIVE BOX
Internal Medicine Progress Note  Erica Landeros, PGY-1  Pager: [phone]    ******INCOMPLETE******    Patient is a 33y old  Male who presents with a chief complaint of Pain (16 Mar 2024 14:48)    OVERNIGHT EVENTS/INTERVAL HPI:    REVIEW OF SYSTEMS:  All other review of systems is negative unless indicated above.    OBJECTIVE:  T(C): 36.6 (03-18-24 @ 06:13), Max: 37.2 (03-17-24 @ 09:49)  HR: 103 (03-18-24 @ 06:13) (102 - 103)  BP: 131/78 (03-18-24 @ 06:13) (114/68 - 143/83)  RR: 19 (03-18-24 @ 06:13) (18 - 20)  SpO2: 90% (03-18-24 @ 06:13) (88% - 93%)  Daily     Daily     Physical Exam:  General: in no acute distress  Eyes: EOMI intact bilaterally. Anicteric sclerae, moist conjunctivae  HENT: Moist mucous membranes  Neck: Trachea midline, supple  Lungs: CTA B/L. No wheezes, rales, or rhonchi  Cardiovascular: RRR. No murmurs, rubs, or gallops  Abdomen: Soft, non-tender non-distended; No rebound or guarding  Extremities: WWP, No clubbing, cyanosis or edema  MSK: No midline bony tenderness. No CVA tenderness bilaterally  Neurological: Alert and oriented x3  Skin: Warm and dry. No obvious rash     Medications:  MEDICATIONS  (STANDING):  cefTRIAXone   IVPB 2000 milliGRAM(s) IV Intermittent every 24 hours  enoxaparin Injectable 40 milliGRAM(s) SubCutaneous every 24 hours  folic acid 1 milliGRAM(s) Oral daily  influenza   Vaccine 0.5 milliLiter(s) IntraMuscular once  polyethylene glycol 3350 17 Gram(s) Oral every 12 hours  senna 2 Tablet(s) Oral at bedtime    MEDICATIONS  (PRN):  acetaminophen     Tablet .. 650 milliGRAM(s) Oral every 6 hours PRN Temp greater or equal to 38C (100.4F), Mild Pain (1 - 3)  naloxone Injectable 0.1 milliGRAM(s) IV Push every 3 minutes PRN For ANY of the following changes in patient status:  A. RR LESS THAN 10 breaths per minute, B. Oxygen saturation LESS THAN 90%, C. Sedation score of 6  ondansetron Injectable 4 milliGRAM(s) IV Push every 6 hours PRN Nausea  oxyCODONE    IR 5 milliGRAM(s) Oral every 4 hours PRN Severe Pain (7 - 10)      Labs:                        10.8   20.18 )-----------( 548      ( 17 Mar 2024 05:30 )             29.5     03-17    135  |  97  |  8   ----------------------------<  104<H>  3.8   |  26  |  0.97    Ca    9.6      17 Mar 2024 05:30  Phos  3.1     03-17  Mg     2.3     03-17        Urinalysis Basic - ( 17 Mar 2024 05:30 )    Color: x / Appearance: x / SG: x / pH: x  Gluc: 104 mg/dL / Ketone: x  / Bili: x / Urobili: x   Blood: x / Protein: x / Nitrite: x   Leuk Esterase: x / RBC: x / WBC x   Sq Epi: x / Non Sq Epi: x / Bacteria: x      SARS-CoV-2: NotDetec (14 Mar 2024 18:35)      Radiology: Reviewed Patient is a 33y old  Male who presents with a chief complaint of Pain (16 Mar 2024 14:48)    OVERNIGHT EVENTS/INTERVAL HPI:    REVIEW OF SYSTEMS:  All other review of systems is negative unless indicated above.    OBJECTIVE:  T(C): 36.6 (03-18-24 @ 06:13), Max: 37.2 (03-17-24 @ 09:49)  HR: 103 (03-18-24 @ 06:13) (102 - 103)  BP: 131/78 (03-18-24 @ 06:13) (114/68 - 143/83)  RR: 19 (03-18-24 @ 06:13) (18 - 20)  SpO2: 90% (03-18-24 @ 06:13) (88% - 93%)  Daily     Daily     Physical Exam:  General: in no acute distress  Eyes: EOMI intact bilaterally. Anicteric sclerae, moist conjunctivae  HENT: Moist mucous membranes  Neck: Trachea midline, supple  Lungs: CTA B/L. No wheezes, rales, or rhonchi  Cardiovascular: RRR. No murmurs, rubs, or gallops  Abdomen: Soft, non-tender non-distended; No rebound or guarding  Extremities: WWP, No clubbing, cyanosis or edema  MSK: No midline bony tenderness. No CVA tenderness bilaterally  Neurological: Alert and oriented x3  Skin: Warm and dry. No obvious rash     Medications:  MEDICATIONS  (STANDING):  cefTRIAXone   IVPB 2000 milliGRAM(s) IV Intermittent every 24 hours  enoxaparin Injectable 40 milliGRAM(s) SubCutaneous every 24 hours  folic acid 1 milliGRAM(s) Oral daily  influenza   Vaccine 0.5 milliLiter(s) IntraMuscular once  polyethylene glycol 3350 17 Gram(s) Oral every 12 hours  senna 2 Tablet(s) Oral at bedtime    MEDICATIONS  (PRN):  acetaminophen     Tablet .. 650 milliGRAM(s) Oral every 6 hours PRN Temp greater or equal to 38C (100.4F), Mild Pain (1 - 3)  naloxone Injectable 0.1 milliGRAM(s) IV Push every 3 minutes PRN For ANY of the following changes in patient status:  A. RR LESS THAN 10 breaths per minute, B. Oxygen saturation LESS THAN 90%, C. Sedation score of 6  ondansetron Injectable 4 milliGRAM(s) IV Push every 6 hours PRN Nausea  oxyCODONE    IR 5 milliGRAM(s) Oral every 4 hours PRN Severe Pain (7 - 10)      Labs:                        10.8   20.18 )-----------( 548      ( 17 Mar 2024 05:30 )             29.5     03-17    135  |  97  |  8   ----------------------------<  104<H>  3.8   |  26  |  0.97    Ca    9.6      17 Mar 2024 05:30  Phos  3.1     03-17  Mg     2.3     03-17        Urinalysis Basic - ( 17 Mar 2024 05:30 )    Color: x / Appearance: x / SG: x / pH: x  Gluc: 104 mg/dL / Ketone: x  / Bili: x / Urobili: x   Blood: x / Protein: x / Nitrite: x   Leuk Esterase: x / RBC: x / WBC x   Sq Epi: x / Non Sq Epi: x / Bacteria: x      SARS-CoV-2: NotDetec (14 Mar 2024 18:35)      Radiology: Reviewed

## 2024-03-18 NOTE — DISCHARGE NOTE NURSING/CASE MANAGEMENT/SOCIAL WORK - NSDCPEFALRISK_GEN_ALL_CORE
For information on Fall & Injury Prevention, visit: [email or web address] OR  [email or web address] OR  https://www.cdc.gov/steadi/patient.html
ADMIT

## 2024-03-18 NOTE — DISCHARGE NOTE NURSING/CASE MANAGEMENT/SOCIAL WORK - PATIENT PORTAL LINK FT
You can access the FollowMyHealth Patient Portal offered by [facility] by registering at the following website: [email or web address]. By joining [employer]’s FollowMyHealth portal, you will also be able to view your health information using other applications (apps) compatible with our system.

## 2024-03-19 LAB
CULTURE RESULTS: SIGNIFICANT CHANGE UP
CULTURE RESULTS: SIGNIFICANT CHANGE UP
HEMOGLOBIN INTERPRETATION: SIGNIFICANT CHANGE UP
HGB A MFR BLD: 0 % — LOW (ref 95.8–98)
HGB A2 MFR BLD: 4.1 % — HIGH (ref 2–3.2)
HGB C MFR BLD: 45.1 % — HIGH
HGB F MFR BLD: 0.4 % — SIGNIFICANT CHANGE UP (ref 0–1)
HGB S MFR BLD: 50.4 % — HIGH
SPECIMEN SOURCE: SIGNIFICANT CHANGE UP
SPECIMEN SOURCE: SIGNIFICANT CHANGE UP

## 2024-03-21 LAB
CULTURE RESULTS: SIGNIFICANT CHANGE UP
SPECIMEN SOURCE: SIGNIFICANT CHANGE UP

## 2024-03-25 DIAGNOSIS — K80.20 CALCULUS OF GALLBLADDER WITHOUT CHOLECYSTITIS WITHOUT OBSTRUCTION: ICD-10-CM

## 2024-03-25 DIAGNOSIS — J15.69 PNEUMONIA DUE TO OTHER GRAM-NEGATIVE BACTERIA: ICD-10-CM

## 2024-03-25 DIAGNOSIS — J96.01 ACUTE RESPIRATORY FAILURE WITH HYPOXIA: ICD-10-CM

## 2024-03-25 DIAGNOSIS — D57.219 SICKLE-CELL/HB-C DISEASE WITH CRISIS, UNSPECIFIED: ICD-10-CM

## 2024-03-25 DIAGNOSIS — R65.20 SEVERE SEPSIS WITHOUT SEPTIC SHOCK: ICD-10-CM

## 2024-03-25 DIAGNOSIS — A41.50 GRAM-NEGATIVE SEPSIS, UNSPECIFIED: ICD-10-CM

## 2024-10-21 NOTE — ED PROVIDER NOTE - TOBACCO USE
Unknown if ever smoked Render Risk Assessment In Note?: no Additional Notes: Receiving bootox special. Detail Level: Simple